# Patient Record
Sex: FEMALE | Race: WHITE | Employment: OTHER | URBAN - METROPOLITAN AREA
[De-identification: names, ages, dates, MRNs, and addresses within clinical notes are randomized per-mention and may not be internally consistent; named-entity substitution may affect disease eponyms.]

---

## 2017-02-13 ENCOUNTER — OFFICE VISIT (OUTPATIENT)
Dept: INTERNAL MEDICINE | Facility: CLINIC | Age: 57
End: 2017-02-13
Payer: COMMERCIAL

## 2017-02-13 VITALS
OXYGEN SATURATION: 96 % | BODY MASS INDEX: 23.4 KG/M2 | SYSTOLIC BLOOD PRESSURE: 100 MMHG | DIASTOLIC BLOOD PRESSURE: 60 MMHG | TEMPERATURE: 99 F | WEIGHT: 145 LBS | HEART RATE: 98 BPM

## 2017-02-13 DIAGNOSIS — G89.29 CHRONIC BILATERAL LOW BACK PAIN WITHOUT SCIATICA: ICD-10-CM

## 2017-02-13 DIAGNOSIS — M79.645 PAIN IN FINGER OF BOTH HANDS: Primary | ICD-10-CM

## 2017-02-13 DIAGNOSIS — M79.644 PAIN IN FINGER OF BOTH HANDS: Primary | ICD-10-CM

## 2017-02-13 DIAGNOSIS — M54.50 CHRONIC BILATERAL LOW BACK PAIN WITHOUT SCIATICA: ICD-10-CM

## 2017-02-13 LAB
CRP SERPL-MCNC: <2.9 MG/L (ref 0–8)
ERYTHROCYTE [DISTWIDTH] IN BLOOD BY AUTOMATED COUNT: 12.1 % (ref 10–15)
ERYTHROCYTE [SEDIMENTATION RATE] IN BLOOD BY WESTERGREN METHOD: 21 MM/H (ref 0–30)
HCT VFR BLD AUTO: 41 % (ref 35–47)
HGB BLD-MCNC: 13.5 G/DL (ref 11.7–15.7)
MCH RBC QN AUTO: 31.4 PG (ref 26.5–33)
MCHC RBC AUTO-ENTMCNC: 32.9 G/DL (ref 31.5–36.5)
MCV RBC AUTO: 95 FL (ref 78–100)
PLATELET # BLD AUTO: 357 10E9/L (ref 150–450)
RBC # BLD AUTO: 4.3 10E12/L (ref 3.8–5.2)
WBC # BLD AUTO: 8.4 10E9/L (ref 4–11)

## 2017-02-13 PROCEDURE — 86200 CCP ANTIBODY: CPT | Performed by: INTERNAL MEDICINE

## 2017-02-13 PROCEDURE — 36415 COLL VENOUS BLD VENIPUNCTURE: CPT | Performed by: INTERNAL MEDICINE

## 2017-02-13 PROCEDURE — 85652 RBC SED RATE AUTOMATED: CPT | Performed by: INTERNAL MEDICINE

## 2017-02-13 PROCEDURE — 86140 C-REACTIVE PROTEIN: CPT | Performed by: INTERNAL MEDICINE

## 2017-02-13 PROCEDURE — 99203 OFFICE O/P NEW LOW 30 MIN: CPT | Performed by: INTERNAL MEDICINE

## 2017-02-13 PROCEDURE — 85027 COMPLETE CBC AUTOMATED: CPT | Performed by: INTERNAL MEDICINE

## 2017-02-13 PROCEDURE — 86431 RHEUMATOID FACTOR QUANT: CPT | Performed by: INTERNAL MEDICINE

## 2017-02-13 NOTE — MR AVS SNAPSHOT
After Visit Summary   2/13/2017    Jeanne Santiago    MRN: [id number]           Patient Information     Date Of Birth          1960        Visit Information        Provider Department      2/13/2017 2:00 PM Noemy Casas MD [facility]        Today's Diagnoses     Pain in finger of both hands    -  1    Chronic bilateral low back pain without sciatica          Care Instructions    For finger joint pain:    Labs today (to evaluate for rheumatoid arthritis)    If those are normal, then the next step is hand therapy.    ---    For back pain, suspect lumbar strain (too much work on lower back).    Recommend course of physical therapy to improve core abdominal strength (to take the work off the back).             Follow-ups after your visit        Additional Services     PHYSICAL THERAPY REFERRAL       *This therapy referral will be filtered to a centralized scheduling office at [facility] and the patient will receive a call to schedule an appointment at a [facility] location most convenient for them. *     [facility] provides Physical Therapy evaluation and treatment and many specialty services across the [place] system.  If requesting a specialty program, please choose from the list below.    If you have not heard from the scheduling office within 2 business days, please call [phone] for all locations, with the exception of [place], please call [phone].  Treatment: Evaluation & Treatment  Special Instructions/Modalities: none  Special Programs: None    Please be aware that coverage of these services is subject to the terms and limitations of your health insurance plan.  Call member services at your health plan with any benefit or coverage questions.      **Note to Provider:  If you are referring outside of [facility] for the therapy appointment, please list the name of the location in the  special instructions  above, print  "the referral and give to the patient to schedule the appointment.                  Who to contact     If you have questions or need follow up information about today's clinic visit or your schedule please contact [facility] directly at [phone].  Normal or non-critical lab and imaging results will be communicated to you by LiveSchoolhart, letter or phone within 4 business days after the clinic has received the results. If you do not hear from us within 7 days, please contact the clinic through LiveSchoolhart or phone. If you have a critical or abnormal lab result, we will notify you by phone as soon as possible.  Submit refill requests through Periscope or call your pharmacy and they will forward the refill request to us. Please allow 3 business days for your refill to be completed.          Additional Information About Your Visit        Periscope Information     Periscope lets you send messages to your doctor, view your test results, renew your prescriptions, schedule appointments and more. To sign up, go to [email or web address] . Click on \"Log in\" on the left side of the screen, which will take you to the Welcome page. Then click on \"Sign up Now\" on the right side of the page.     You will be asked to enter the access code listed below, as well as some personal information. Please follow the directions to create your username and password.     Your access code is: [email or web address]  Expires: 2017  2:35 PM     Your access code will  in 90 days. If you need help or a new code, please call your [place] clinic or [phone].        Care EveryWhere ID     This is your Care EveryWhere ID. This could be used by other organizations to access your [place] medical records  [id number]        Your Vitals Were     Pulse Temperature Last Period Pulse Oximetry BMI (Body Mass Index)       98 99  F (37.2  C) 2016 (Approximate) 96% 23.4 kg/m2        Blood Pressure from Last 3 Encounters:   17 " 100/60   06/29/16 120/77   06/07/16 120/60    Weight from Last 3 Encounters:   02/13/17 145 lb (65.8 kg)   06/17/16 155 lb 3.2 oz (70.4 kg)   06/07/16 160 lb (72.6 kg)              We Performed the Following     CBC with platelets     CRP, inflammation     Cyclic Citrullinated Peptide Antibody IgG     ESR: Erythrocyte sedimentation rate     PHYSICAL THERAPY REFERRAL     Rheumatoid factor        Primary Care Provider    None Specified       No primary provider on file.        Thank you!     Thank you for choosing [facility]  for your care. Our goal is always to provide you with excellent care. Hearing back from our patients is one way we can continue to improve our services. Please take a few minutes to complete the written survey that you may receive in the mail after your visit with us. Thank you!             Your Updated Medication List - Protect others around you: Learn how to safely use, store and throw away your medicines at www.disposemymeds.org.          This list is accurate as of: 2/13/17  2:36 PM.  Always use your most recent med list.                   Brand Name Dispense Instructions for use    ARTIFICIAL TEARS 1.4 % ophthalmic solution   Generic drug:  polyvinyl alcohol      Place 1 drop into both eyes every 4 hours as needed for dry eyes       calcium carb 1250 mg (500 mg [place])/vitamin D 200 units 500-200 MG-UNIT per tablet    OSCAL with D     Take 1 tablet by mouth 2 times daily       CERTAVITE/ANTIOXIDANTS PO      Take 1 tablet by mouth daily       clonazePAM 0.25 MG Tbdp ODT tab    klonoPIN    60 tablet    Take 1 tablet (0.25 mg) by mouth 2 times daily as needed for anxiety       hydrOXYzine 50 MG tablet    ATARAX     Take 50 mg by mouth every 8 hours as needed for itching       ketotifen 0.025 % Soln ophthalmic solution    ZADITOR/REFRESH ANTI-ITCH     Place 1 drop into both eyes 2 times daily PT RAN OUT ON 6/15 AND NEEDED AN MD TO OK A REFILL       sodium chloride 0.65 %  nasal spray    OCEAN     Spray 1 spray into both nostrils every 2 hours as needed for congestion       venlafaxine 150 MG Tb24 24 hr tablet    EFFEXOR-ER     Take 150 mg by mouth daily (with breakfast)       vitamin D 2000 UNITS Caps      Take 2,000 Units by mouth daily

## 2017-02-13 NOTE — PROGRESS NOTES
"  SUBJECTIVE:                                                      HPI: Jeanne Santiago is a pleasant 56 year old female who presents with finger and back pain:    Accompanied by group home staff member.    Re: finger pain:  - involves fingers and thumb of both hands, equally  - pain is located in joints (indicates DIP and PIP) only - no MCP involvement  - ongoing for a year now  - no precipitating injury  - no new manual activities, jobs, or hobbies  - describes as tender to the touch  - associated stiffness  - associated numbness and tingling over DIP and PIPs    - no associated joint or finger swelling, redness, or skin changes  - no loss of function, but finds activities using her hands uncomfortable   - for example, has pain with lifting groceries  - no fevers or chills    - reports that wrist are sometimes tender and stiff, but no consistent symptoms   - no other joint involvement    No FH of RA.    Re: back pain:  - bilateral lower back, symmetric  - no radiation down legs  - ongoing now for 9 months  - no precipitating trauma, fall, or unusual exertion  - describes are soreness and stiffness  - notices it most when standing up (from seated position) and going up stairs    - no lower leg weakness, numbness, or tingling  - no urinary retention or incontinence  - no fecal incontinence    - pain improves with ibuprofen use    No prior history of back injuries, procedures, or surgeries.   Does not perform any strenuous activities during day - sits for most of day \"not doing much.\"  Does not exercise.    PMH reviewed - multiple active psychiatric diagnoses, but none seem pertinent to above concerns.    PMH also significant for cognitive impairment of unclear etiology - theoretically may affect above history, but patient's recall of history of symptoms seems appropriate.     The medication, allergy, and problem lists have been reviewed and updated as appropriate.     OBJECTIVE:                                          "             /60 (BP Location: Left arm, Patient Position: Chair, Cuff Size: Adult Regular)  Pulse 98  Temp 99  F (37.2  C)  Wt 145 lb (65.8 kg)  LMP 05/27/2016 (Approximate)  SpO2 96%  BMI 23.4 kg/m2  Constitutional: well-appearing  Lumbar spine: no deformity, crepitus, or step-off; no spinal tenderness to palpation; bilateral brannon-spinal tenderness to palpation; negative straight leg raise bilaterally  Wrist/Hand/Finger exam:  - all joints are normal in appearance - no deformity, swelling, redness, or skin changes  - normal range of motion  - full strength  - all DIP and PIPs are equally tender to palpation; no bogginess noted    ASSESSMENT/PLAN:                                                      (M79.645,  M79.644) Pain in finger of both hands  (primary encounter diagnosis)  Comment: etiology unclear, but need to rule out RA.  Plan:    - CBC, CRP, ESR, CCP, and RF.   - if labs unrevealing, will refer to hand therapy for evaluation and treatment.   - if pain worsens, changes, or does not respond to above, patient to contact MD.     (M54.5,  G89.29) Chronic bilateral low back pain without sciatica  Comment: suspect lumbar strain from poor core abdominal strength (lack of exercise, sedentary lifestyle).  Plan:    - warm compresses, NSAIDs, and Tylenol as needed.   - referred to PT for further evaluation and management.    - if pain worsens, changes, or does not respond to above, patient to contact MD.     The instructions on the AVS were discussed and explained to the patient. Patient expressed understanding of instructions.    A total of 30 minutes were spent face-to-face with this patient during this encounter and over half of that time was spent on counseling and coordination of care re: above diagnoses and plans of care.     Noemy Casas MD   [address] [address]  T: [phone], F: [phone]

## 2017-02-13 NOTE — PATIENT INSTRUCTIONS
For finger joint pain:    Labs today (to evaluate for rheumatoid arthritis)    If those are normal, then the next step is hand therapy.    ---    For back pain, suspect lumbar strain (too much work on lower back).    Recommend course of physical therapy to improve core abdominal strength (to take the work off the back).

## 2017-02-15 LAB — CCP AB SER IA-ACNC: 1 U/ML

## 2017-02-17 ENCOUNTER — TELEPHONE (OUTPATIENT)
Dept: INTERNAL MEDICINE | Facility: CLINIC | Age: 57
End: 2017-02-17

## 2017-02-17 ENCOUNTER — THERAPY VISIT (OUTPATIENT)
Dept: PHYSICAL THERAPY | Facility: CLINIC | Age: 57
End: 2017-02-17
Payer: COMMERCIAL

## 2017-02-17 DIAGNOSIS — G89.29 CHRONIC BILATERAL LOW BACK PAIN WITHOUT SCIATICA: Primary | ICD-10-CM

## 2017-02-17 DIAGNOSIS — M54.50 CHRONIC BILATERAL LOW BACK PAIN WITHOUT SCIATICA: Primary | ICD-10-CM

## 2017-02-17 DIAGNOSIS — M25.549 PAIN IN MULTIPLE FINGER JOINTS: Primary | ICD-10-CM

## 2017-02-17 LAB — RHEUMATOID FACT SER NEPH-ACNC: <20 IU/ML (ref 0–20)

## 2017-02-17 PROCEDURE — 97110 THERAPEUTIC EXERCISES: CPT | Mod: GP | Performed by: PHYSICAL THERAPIST

## 2017-02-17 PROCEDURE — 97161 PT EVAL LOW COMPLEX 20 MIN: CPT | Mod: GP | Performed by: PHYSICAL THERAPIST

## 2017-02-17 NOTE — TELEPHONE ENCOUNTER
Discussed results with patient - all normal and unremarkable.    Next step is to see hand therapy to improve manual dexterity and strength.    Referral placed.

## 2017-02-17 NOTE — PROGRESS NOTES
"[facility] Initial Evaluation    Subjective:    Jeanne Santiago is a 56 year old female with a lumbar condition.  Condition occurred with:  Insidious onset.  Condition occurred: for unknown reasons.  This is a chronic condition  Onset of B LBP February 2016 for unknown reasons. MD orders dated 2-13-17/ Pt states \"I'm out of shape. I'm weak.\" Cannot get off floor without pushing up on furniture. Complains of stiffness and soreness across low back. LBP with sit to stand and with prolonged sitting. Carrying groceries is bothersome. Cannot lift a double pack of milk (15#).    Patient reports pain:  Lumbar spine right and lumbar spine left.    Pain is described as aching and other (stiff) and is intermittent and reported as 4/10 and 2/10.   Worse during: varies.  Symptoms are exacerbated by lifting, sitting and other (sit to stand) and relieved by NSAID's.  Since onset symptoms are unchanged.        General health as reported by patient is good.                  Barriers include:  None as reported by the patient (has split entry home).    Red flags:  None as reported by the patient.                      Objective:    System         Lumbar/SI Evaluation  ROM:    AROM Lumbar:   Flexion:        Hands to mid lower leg  Ext:                    50% (L LBP)   Side Bend:        Left:     Right:   Rotation:           Left:     Right:   Side Glide:        Left:     Right:           Lumbar Myotomes:  not assessed            Lumbar DTR's:  not assessed        Lumbar Dermtomes:  not assessed                Neural Tension/Mobility:      Left side:Slump  negative.     Right side:   Slump  negative.   Lumbar Palpation:    Tenderness present at Left:    Quadratus Lumborum and Erector Spinae (hypertonicity)    Tenderness not present at Right:  Quadratus Lumborum or Erector Spinae      Spinal Segmental Conclusions:     Level: Hypo noted at L1, L2, L3, L4 and L5                                                     Nava" Lumbar Evaluation    Posture:    Standing: fair    Lateral Shift: no      Movement Loss:  Flexion (Flex): min  Extension (EXT): mod and pain                                                   ROS    Started with 2/10 L LBP.   Prone lying abolished sx's.  NOA produced L LBP, NW after.  Unable to perform REIL due to weakness.  LORENZO increased L LBP during, NW after, increased motion.      Assessment/Plan:      Patient is a 56 year old female with lumbar complaints.  Her CC is soreness and stiffness across low back especially after sitting and with sit to stand. Lifting her groceries is bothersome. She feels she is generally weak and would like to have strengthening exercises.   Patient has the following significant findings with corresponding treatment plan.                Diagnosis 1:  B LBP  Pain -  manual therapy and directional preference exercise  Decreased ROM/flexibility - manual therapy and therapeutic exercise  Decreased joint mobility - manual therapy and therapeutic exercise  Decreased strength - therapeutic exercise and therapeutic activities  Decreased function - therapeutic activities  Impaired posture - neuro re-education    Therapy Evaluation Codes:   1) History comprised of:   Personal factors that impact the plan of care:      None.    Comorbidity factors that impact the plan of care are:      None.     Medications impacting care: None.  2) Examination of Body Systems comprised of:   Body structures and functions that impact the plan of care:      Lumbar spine.   Activity limitations that impact the plan of care are:      Lifting and Sitting.  3) Clinical presentation characteristics are:   Stable/Uncomplicated.  4) Decision-Making    Low complexity using standardized patient assessment instrument and/or measureable assessment of functional outcome.  Cumulative Therapy Evaluation is: Low complexity.    Previous and current functional limitations:  (See Goal Flow Sheet for this information)    Short  term and Long term goals: (See Goal Flow Sheet for this information)     Communication ability:  Patient appears to be able to clearly communicate and understand verbal and written communication and follow directions correctly.  Treatment Explanation - The following has been discussed with the patient:   RX ordered/plan of care  Anticipated outcomes  Possible risks and side effects  This patient would benefit from PT intervention to resume normal activities.   Rehab potential is good.    Frequency:  1 X week, once daily  Duration:  for 8 weeks  Discharge Plan:  Achieve all LTG.  Independent in home treatment program.  Reach maximal therapeutic benefit.    Please refer to the daily flowsheet for treatment today, total treatment time and time spent performing 1:1 timed codes.

## 2017-02-17 NOTE — PROGRESS NOTES
Subjective:                                       Pertinent medical history includes:  Mental illness and depression.  Medical allergies: yes (Lavaquine).    Current medications:  None as reported by patient.  Current occupation is None.    Primary job tasks include:  Prolonged sitting.            Oswestry Score: 13.33 %                 Objective:    System    Physical Exam    General     ROS    Assessment/Plan:

## 2017-02-17 NOTE — MR AVS SNAPSHOT
"              After Visit Summary   2/17/2017    Jeanne Santiago    MRN: [id number]           Patient Information     Date Of Birth          1960        Visit Information        Provider Department      2/17/2017 10:50 AM Teresa Kinsey PT [facility]        Today's Diagnoses     Chronic bilateral low back pain without sciatica    -  1       Follow-ups after your visit        Your next 10 appointments already scheduled     Feb 24, 2017 11:30 AM [facility]   CAROLINE Spine with Teresa Kinsey PT   [facility] ([facility]    [address] [id number] [phone]              Who to contact     If you have questions or need follow up information about today's clinic visit or your schedule please contact [facility] PH[facility]ERAP[facility] directly at [phone].  Normal or non-critical lab and imaging results will be communicated to you by PPIhart, letter or phone within 4 business days after the clinic has received the results. If you do not hear from us within 7 days, please contact the clinic through PPIhart or phone. If you have a critical or abnormal lab result, we will notify you by phone as soon as possible.  Submit refill requests through Fancloud or call your pharmacy and they will forward the refill request to us. Please allow 3 business days for your refill to be completed.          Additional Information About Your Visit        MyChart Information     Fancloud lets you send messages to your doctor, view your test results, renew your prescriptions, schedule appointments and more. To sign up, go to [email or web address] . Click on \"Log in\" on the left side of the screen, which will take you to the Welcome page. Then click on \"Sign up Now\" on the right side of the page.     You will be asked to enter the access code listed below, as well as some " personal information. Please follow the directions to create your username and password.     Your access code is: [email or web address]  Expires: 2017  2:35 PM     Your access code will  in 90 days. If you need help or a new code, please call your [place] clinic or [phone].        Care EveryWhere ID     This is your Care EveryWhere ID. This could be used by other organizations to access your [place] medical records  [id number]        Your Vitals Were     Last Period                   2016 (Approximate)            Blood Pressure from Last 3 Encounters:   17 100/60   16 120/77   16 120/60    Weight from Last 3 Encounters:   17 65.8 kg (145 lb)   16 70.4 kg (155 lb 3.2 oz)   16 72.6 kg (160 lb)              We Performed the Following     HC PT EVAL, LOW COMPLEXITY     CAROLINE INITIAL EVAL REPORT     THERAPEUTIC EXERCISES        Primary Care Provider    None Specified       No primary provider on file.        Thank you!     Thank you for choosing [facility]ER[facility]  for your care. Our goal is always to provide you with excellent care. Hearing back from our patients is one way we can continue to improve our services. Please take a few minutes to complete the written survey that you may receive in the mail after your visit with us. Thank you!             Your Updated Medication List - Protect others around you: Learn how to safely use, store and throw away your medicines at www.disposemymeds.org.          This list is accurate as of: 17  2:10 PM.  Always use your most recent med list.                   Brand Name Dispense Instructions for use    ARTIFICIAL TEARS 1.4 % ophthalmic solution   Generic drug:  polyvinyl alcohol      Place 1 drop into both eyes every 4 hours as needed for dry eyes       calcium carb 1250 mg (500 mg [place])/vitamin D 200 units 500-200 MG-UNIT per tablet    OSCAL with D     Take 1 tablet by mouth 2 times  daily       CERTAVITE/ANTIOXIDANTS PO      Take 1 tablet by mouth daily       clonazePAM 0.25 MG Tbdp ODT tab    klonoPIN    60 tablet    Take 1 tablet (0.25 mg) by mouth 2 times daily as needed for anxiety       hydrOXYzine 50 MG tablet    ATARAX     Take 50 mg by mouth every 8 hours as needed for itching       ketotifen 0.025 % Soln ophthalmic solution    ZADITOR/REFRESH ANTI-ITCH     Place 1 drop into both eyes 2 times daily PT RAN OUT ON 6/15 AND NEEDED AN MD TO OK A REFILL       sodium chloride 0.65 % nasal spray    OCEAN     Spray 1 spray into both nostrils every 2 hours as needed for congestion       venlafaxine 150 MG Tb24 24 hr tablet    EFFEXOR-ER     Take 150 mg by mouth daily (with breakfast)       vitamin D 2000 UNITS Caps      Take 2,000 Units by mouth daily

## 2017-02-24 ENCOUNTER — THERAPY VISIT (OUTPATIENT)
Dept: PHYSICAL THERAPY | Facility: CLINIC | Age: 57
End: 2017-02-24
Payer: COMMERCIAL

## 2017-02-24 DIAGNOSIS — G89.29 CHRONIC BILATERAL LOW BACK PAIN WITHOUT SCIATICA: ICD-10-CM

## 2017-02-24 DIAGNOSIS — M54.50 CHRONIC BILATERAL LOW BACK PAIN WITHOUT SCIATICA: ICD-10-CM

## 2017-02-24 PROCEDURE — 97530 THERAPEUTIC ACTIVITIES: CPT | Mod: GP | Performed by: PHYSICAL THERAPIST

## 2017-02-24 PROCEDURE — 97110 THERAPEUTIC EXERCISES: CPT | Mod: GP | Performed by: PHYSICAL THERAPIST

## 2017-03-03 ENCOUNTER — THERAPY VISIT (OUTPATIENT)
Dept: PHYSICAL THERAPY | Facility: CLINIC | Age: 57
End: 2017-03-03
Payer: COMMERCIAL

## 2017-03-03 DIAGNOSIS — G89.29 CHRONIC BILATERAL LOW BACK PAIN WITHOUT SCIATICA: ICD-10-CM

## 2017-03-03 DIAGNOSIS — M54.50 CHRONIC BILATERAL LOW BACK PAIN WITHOUT SCIATICA: ICD-10-CM

## 2017-03-03 PROCEDURE — 97530 THERAPEUTIC ACTIVITIES: CPT | Mod: GP | Performed by: PHYSICAL THERAPIST

## 2017-03-03 PROCEDURE — 97110 THERAPEUTIC EXERCISES: CPT | Mod: GP | Performed by: PHYSICAL THERAPIST

## 2017-03-09 ENCOUNTER — THERAPY VISIT (OUTPATIENT)
Dept: PHYSICAL THERAPY | Facility: CLINIC | Age: 57
End: 2017-03-09
Payer: COMMERCIAL

## 2017-03-09 DIAGNOSIS — G89.29 CHRONIC BILATERAL LOW BACK PAIN WITHOUT SCIATICA: ICD-10-CM

## 2017-03-09 DIAGNOSIS — M54.50 CHRONIC BILATERAL LOW BACK PAIN WITHOUT SCIATICA: ICD-10-CM

## 2017-03-09 PROCEDURE — 97140 MANUAL THERAPY 1/> REGIONS: CPT | Mod: GP | Performed by: PHYSICAL THERAPIST

## 2017-03-09 PROCEDURE — 97110 THERAPEUTIC EXERCISES: CPT | Mod: GP | Performed by: PHYSICAL THERAPIST

## 2017-03-17 ENCOUNTER — THERAPY VISIT (OUTPATIENT)
Dept: PHYSICAL THERAPY | Facility: CLINIC | Age: 57
End: 2017-03-17
Payer: COMMERCIAL

## 2017-03-17 DIAGNOSIS — M54.50 CHRONIC BILATERAL LOW BACK PAIN WITHOUT SCIATICA: ICD-10-CM

## 2017-03-17 DIAGNOSIS — G89.29 CHRONIC BILATERAL LOW BACK PAIN WITHOUT SCIATICA: ICD-10-CM

## 2017-03-17 PROCEDURE — 97530 THERAPEUTIC ACTIVITIES: CPT | Mod: GP | Performed by: PHYSICAL THERAPIST

## 2017-03-17 PROCEDURE — 97110 THERAPEUTIC EXERCISES: CPT | Mod: GP | Performed by: PHYSICAL THERAPIST

## 2017-03-17 NOTE — MR AVS SNAPSHOT
"              After Visit Summary   3/17/2017    Jeanne Santiago    MRN: [id number]           Patient Information     Date Of Birth          1960        Visit Information        Provider Department      3/17/2017 11:30 AM Teresa Kinsey PT [facility] Physical Therapy        Today's Diagnoses     Chronic bilateral low back pain without sciatica           Follow-ups after your visit        Who to contact     If you have questions or need follow up information about today's clinic visit or your schedule please contact [facility] PH[facility] THERAPY directly at [phone].  Normal or non-critical lab and imaging results will be communicated to you by [employer], letter or phone within 4 business days after the clinic has received the results. If you do not hear from us within 7 days, please contact the clinic through [employer] or phone. If you have a critical or abnormal lab result, we will notify you by phone as soon as possible.  Submit refill requests through [employer] or call your pharmacy and they will forward the refill request to us. Please allow 3 business days for your refill to be completed.          Additional Information About Your Visit        MyChart Information     [employer] lets you send messages to your doctor, view your test results, renew your prescriptions, schedule appointments and more. To sign up, go to [email or web address] . Click on \"Log in\" on the left side of the screen, which will take you to the Welcome page. Then click on \"Sign up Now\" on the right side of the page.     You will be asked to enter the access code listed below, as well as some personal information. Please follow the directions to create your username and password.     Your access code is: [email or web address]  Expires: 2017  3:35 PM     Your access code will  in 90 days. If you need help or a new code, please call your [place] clinic or [phone].      "   Care EveryWhere ID     This is your Care EveryWhere ID. This could be used by other organizations to access your [facility] medical records  [id number]        Your Vitals Were     Last Period                   05/27/2016 (Approximate)            Blood Pressure from Last 3 Encounters:   02/13/17 100/60   06/29/16 120/77   06/07/16 120/60    Weight from Last 3 Encounters:   02/13/17 65.8 kg (145 lb)   06/17/16 70.4 kg (155 lb 3.2 oz)   06/07/16 72.6 kg (160 lb)              We Performed the Following     CAROLINE PROGRESS NOTES REPORT     THERAPEUTIC ACTIVITIES     THERAPEUTIC EXERCISES        Primary Care Provider    None Specified       No primary provider on file.        Thank you!     Thank you for choosing [place] [facility] PHYSICAL THERAPY  for your care. Our goal is always to provide you with excellent care. Hearing back from our patients is one way we can continue to improve our services. Please take a few minutes to complete the written survey that you may receive in the mail after your visit with us. Thank you!             Your Updated Medication List - Protect others around you: Learn how to safely use, store and throw away your medicines at www.disposemymeds.org.          This list is accurate as of: 3/17/17 12:39 PM.  Always use your most recent med list.                   Brand Name Dispense Instructions for use    ARTIFICIAL TEARS 1.4 % ophthalmic solution   Generic drug:  polyvinyl alcohol      Place 1 drop into both eyes every 4 hours as needed for dry eyes       calcium carb 1250 mg (500 mg [place])/vitamin D 200 units 500-200 MG-UNIT per tablet    OSCAL with D     Take 1 tablet by mouth 2 times daily       CERTAVITE/ANTIOXIDANTS PO      Take 1 tablet by mouth daily       clonazePAM 0.25 MG Tbdp ODT tab    klonoPIN    60 tablet    Take 1 tablet (0.25 mg) by mouth 2 times daily as needed for anxiety       hydrOXYzine 50 MG tablet    ATARAX     Take 50 mg by mouth every 8 hours as  needed for itching       ketotifen 0.025 % Soln ophthalmic solution    ZADITOR/REFRESH ANTI-ITCH     Place 1 drop into both eyes 2 times daily PT RAN OUT ON 6/15 AND NEEDED AN MD TO OK A REFILL       sodium chloride 0.65 % nasal spray    OCEAN     Spray 1 spray into both nostrils every 2 hours as needed for congestion       venlafaxine 150 MG Tb24 24 hr tablet    EFFEXOR-ER     Take 150 mg by mouth daily (with breakfast)       vitamin D 2000 UNITS Caps      Take 2,000 Units by mouth daily

## 2017-03-17 NOTE — PROGRESS NOTES
"Subjective:    HPI                    Objective:    System    Physical Exam    General     ROS    Assessment/Plan:      DISCHARGE REPORT    Progress reporting period is from 2-17-17 to 3-17-17.       SUBJECTIVE   Feeling much better. Back stiffness and ache is nearly gone. Lifting and bending without pain. Feels stronger since doing the exercises. Feels ready to continue on her own.    Current Pain level: 0/10.      Initial Pain level: 4/10.   Changes in function:  Yes (See Goal flowsheet attached for changes in current functional level)  Adverse reaction to treatment or activity: None    OBJECTIVE  Changes noted in objective findings:  Yes, improved lumbar motion, body mechanics, strength as demonstrated by increased reps and difficulty of exercises, and decreased hypertonicity/TTP.  Objective: LAROM: flex=hands 4\" from ankles (mid LE at IE), ext=75% (50% at IE). No TTP or hypertonicity to lumbar erector spinae on left (was present at IE.) Able to demonstrate good body mechanics with partial squat lift of 15# crate from floor to seat height.     ASSESSMENT/PLAN  Patient has progressed well and is independent in home exercise program. She is ready to continue on her own with her HEP. She will call if she has any problems.  Updated problem list and treatment plan: Diagnosis 1:  LBP    STG/LTGs have been met or progress has been made towards goals:  Yes (See Goal flow sheet completed today.)  Assessment of Progress: The patient's condition is improving.  The patient has met all of their long term goals.  Self Management Plans:  Patient is independent in a home treatment program.  Patient is independent in self management of symptoms.    Jeanne continues to require the following intervention to meet STG and LTG's:  PT intervention is no longer required to meet STG/LTG.    Recommendations:  This patient is ready to be discharged from therapy and continue their home treatment program.    Please refer to the daily flowsheet " for treatment today, total treatment time and time spent performing 1:1 timed codes.

## 2017-05-19 ENCOUNTER — RADIANT APPOINTMENT (OUTPATIENT)
Dept: MAMMOGRAPHY | Facility: CLINIC | Age: 57
End: 2017-05-19
Attending: INTERNAL MEDICINE
Payer: COMMERCIAL

## 2017-05-19 ENCOUNTER — OFFICE VISIT (OUTPATIENT)
Dept: INTERNAL MEDICINE | Facility: CLINIC | Age: 57
End: 2017-05-19
Payer: COMMERCIAL

## 2017-05-19 VITALS
OXYGEN SATURATION: 96 % | HEART RATE: 93 BPM | WEIGHT: 142.6 LBS | DIASTOLIC BLOOD PRESSURE: 60 MMHG | SYSTOLIC BLOOD PRESSURE: 110 MMHG | HEIGHT: 65 IN | BODY MASS INDEX: 23.76 KG/M2 | TEMPERATURE: 98.5 F

## 2017-05-19 DIAGNOSIS — Z12.39 SCREENING FOR BREAST CANCER: ICD-10-CM

## 2017-05-19 DIAGNOSIS — Z00.00 ROUTINE HISTORY AND PHYSICAL EXAMINATION OF ADULT: Primary | ICD-10-CM

## 2017-05-19 DIAGNOSIS — Z11.59 NEED FOR HEPATITIS C SCREENING TEST: ICD-10-CM

## 2017-05-19 DIAGNOSIS — Z12.11 SCREEN FOR COLON CANCER: ICD-10-CM

## 2017-05-19 DIAGNOSIS — Z13.220 SCREENING FOR CHOLESTEROL LEVEL: ICD-10-CM

## 2017-05-19 DIAGNOSIS — Z13.1 SCREENING FOR DIABETES MELLITUS: ICD-10-CM

## 2017-05-19 LAB
CHOLEST SERPL-MCNC: 231 MG/DL
GLUCOSE SERPL-MCNC: 97 MG/DL (ref 70–99)
HCV AB SERPL QL IA: NORMAL
HDLC SERPL-MCNC: 65 MG/DL
LDLC SERPL CALC-MCNC: 150 MG/DL
NONHDLC SERPL-MCNC: 166 MG/DL
TRIGL SERPL-MCNC: 79 MG/DL

## 2017-05-19 PROCEDURE — 36415 COLL VENOUS BLD VENIPUNCTURE: CPT | Performed by: INTERNAL MEDICINE

## 2017-05-19 PROCEDURE — 99396 PREV VISIT EST AGE 40-64: CPT | Performed by: INTERNAL MEDICINE

## 2017-05-19 PROCEDURE — 82947 ASSAY GLUCOSE BLOOD QUANT: CPT | Performed by: INTERNAL MEDICINE

## 2017-05-19 PROCEDURE — 86803 HEPATITIS C AB TEST: CPT | Performed by: INTERNAL MEDICINE

## 2017-05-19 PROCEDURE — 80061 LIPID PANEL: CPT | Performed by: INTERNAL MEDICINE

## 2017-05-19 PROCEDURE — G0202 SCR MAMMO BI INCL CAD: HCPCS | Mod: TC

## 2017-05-19 RX ORDER — VENLAFAXINE HYDROCHLORIDE 75 MG/1
75 CAPSULE, EXTENDED RELEASE ORAL DAILY
Refills: 2 | COMMUNITY
Start: 2017-05-04 | End: 2019-05-02

## 2017-05-19 RX ORDER — BREXPIPRAZOLE 2 MG/1
2 TABLET ORAL DAILY
Refills: 2 | COMMUNITY
Start: 2017-05-04 | End: 2018-02-20 | Stop reason: ALTCHOICE

## 2017-05-19 RX ORDER — QUETIAPINE FUMARATE 200 MG/1
200 TABLET, FILM COATED ORAL DAILY
Refills: 2 | COMMUNITY
Start: 2017-05-04 | End: 2018-02-20

## 2017-05-19 RX ORDER — QUETIAPINE FUMARATE 100 MG/1
100 TABLET, FILM COATED ORAL PRN
COMMUNITY
Start: 2017-05-19 | End: 2018-02-20

## 2017-05-19 NOTE — LETTER
[address] [address]  [phone]      05/19/17      Jeanne Santiago  [address] [address]        Dear ,    It was a pleasure seeing you again the other day! I hope this finds you well.    I wanted to let you know that your labs came back.    Your cholesterol is bit elevated, but no medication is needed. I would, instead, recommend lifestyle changes including adhering to a healthy diet and exercising regularly (goal is 30 minutes/day, 5 days/week).     Please feel free to contact me with any questions or concerns.      Take care,    Noemy Casas MD   [address] [address]  T: [phone], F: [phone]

## 2017-05-19 NOTE — MR AVS SNAPSHOT
After Visit Summary   5/19/2017    Jeanne Santiago    MRN: [id number]           Patient Information     Date Of Birth          1960        Visit Information        Provider Department      5/19/2017 9:45 AM Noemy Casas MD [facility]        Today's Diagnoses     Screening for breast cancer    -  1    Screen for colon cancer        Screening for cholesterol level        Screening for diabetes mellitus        Need for hepatitis C screening test          Care Instructions    Please schedule mammogram on your way out.    ---    You will be contacted to schedule your colonoscopy.    ---    Labs - please proceed to our first floor laboratory to have these drawn (show them your orange ticket).     Results:    If normal: we will release results in [employer] or send them in the mail. You will not be called for these results.    If abnormal, but non-urgent: we will release results in [employer] or send them in the mail. You will not be called for these results.    If abnormal and urgent: we will call you.    ---                Follow-ups after your visit        Additional Services     GASTROENTEROLOGY ADULT REF PROCEDURE ONLY       Last Lab Result: Creatinine (mg/dL)       Date                     Value                 06/17/2016               0.64             ----------  Body mass index is 24.02 kg/(m^2).      Patient will be contacted to schedule procedure.     Please be aware that coverage of these services is subject to the terms and limitations of your health insurance plan.  Call member services at your health plan with any benefit or coverage questions.  Any procedures must be performed at a [facility] facility OR coordinated by your clinic's referral office.    Please bring the following with you to your appointment:    (1) Any X-Rays, CTs or MRIs which have been performed.  Contact the facility where they were done to arrange for  prior to your scheduled appointment.   "  (2) List of current medications   (3) This referral request   (4) Any documents/labs given to you for this referral                  Future tests that were ordered for you today     Open Future Orders        Priority Expected Expires Ordered    MA Screening Digital Bilateral Routine  2018            Who to contact     If you have questions or need follow up information about today's clinic visit or your schedule please contact [facility] directly at [phone].  Normal or non-critical lab and imaging results will be communicated to you by [employer], letter or phone within 4 business days after the clinic has received the results. If you do not hear from us within 7 days, please contact the clinic through [employer] or phone. If you have a critical or abnormal lab result, we will notify you by phone as soon as possible.  Submit refill requests through [employer] or call your pharmacy and they will forward the refill request to us. Please allow 3 business days for your refill to be completed.          Additional Information About Your Visit        [employer] Information     [employer] lets you send messages to your doctor, view your test results, renew your prescriptions, schedule appointments and more. To sign up, go to [email or web address] . Click on \"Log in\" on the left side of the screen, which will take you to the Welcome page. Then click on \"Sign up Now\" on the right side of the page.     You will be asked to enter the access code listed below, as well as some personal information. Please follow the directions to create your username and password.     Your access code is: [email or web address]  Expires: 8/15/2017 10:30 AM     Your access code will  in 90 days. If you need help or a new code, please call your [place] clinic or [phone].        Care EveryWhere ID     This is your Care EveryWhere ID. This could be used by other organizations to access your [place] medical " "records  [id number]        Your Vitals Were     Pulse Temperature Height Last Period Pulse Oximetry BMI (Body Mass Index)    93 98.5  F (36.9  C) (Oral) 5' 4.6\" (1.641 m) 05/27/2016 (Approximate) 96% 24.02 kg/m2       Blood Pressure from Last 3 Encounters:   05/19/17 110/60   02/13/17 100/60   06/29/16 120/77    Weight from Last 3 Encounters:   05/19/17 142 lb 9.6 oz (64.7 kg)   02/13/17 145 lb (65.8 kg)   06/17/16 155 lb 3.2 oz (70.4 kg)              We Performed the Following     GASTROENTEROLOGY ADULT REF PROCEDURE ONLY     Glucose     Hepatitis C Screen Reflex to HCV RNA Quant and Genotype     Lipid Profile        Primary Care Provider    None Specified       No primary provider on file.        Thank you!     Thank you for choosing [facility]  for your care. Our goal is always to provide you with excellent care. Hearing back from our patients is one way we can continue to improve our services. Please take a few minutes to complete the written survey that you may receive in the mail after your visit with us. Thank you!             Your Updated Medication List - Protect others around you: Learn how to safely use, store and throw away your medicines at www.disposemymeds.org.          This list is accurate as of: 5/19/17 10:21 AM.  Always use your most recent med list.                   Brand Name Dispense Instructions for use    ARTIFICIAL TEARS 1.4 % ophthalmic solution   Generic drug:  polyvinyl alcohol      Place 1 drop into both eyes every 4 hours as needed for dry eyes       calcium carb 1250 mg (500 mg [place])/vitamin D 200 units 500-200 MG-UNIT per tablet    OSCAL with D     Take 1 tablet by mouth 2 times daily       CERTAVITE/ANTIOXIDANTS PO      Take 1 tablet by mouth daily       * QUEtiapine 200 MG tablet    SEROquel     Take 200 mg by mouth daily       * QUEtiapine 100 MG tablet    SEROquel     Take 1 tablet (100 mg) by mouth as needed       REXULTI 2 MG tablet   Generic drug:  " brexpiprazole      Take 2 mg by mouth daily       sodium chloride 0.65 % nasal spray    OCEAN     Spray 1 spray into both nostrils every 2 hours as needed for congestion       * venlafaxine 150 MG Tb24 24 hr tablet    EFFEXOR-ER     Take 150 mg by mouth daily (with breakfast)       * venlafaxine 75 MG 24 hr capsule    EFFEXOR-XR     Take 75 mg by mouth daily       vitamin D 2000 UNITS Caps      Take 2,000 Units by mouth daily       * Notice:  This list has 4 medication(s) that are the same as other medications prescribed for you. Read the directions carefully, and ask your doctor or other care provider to review them with you.

## 2017-05-19 NOTE — PATIENT INSTRUCTIONS
Please schedule mammogram on your way out.    ---    You will be contacted to schedule your colonoscopy.    ---    Labs - please proceed to our first floor laboratory to have these drawn (show them your orange ticket).     Results:    If normal: we will release results in [employer]t or send them in the mail. You will not be called for these results.    If abnormal, but non-urgent: we will release results in MyChart or send them in the mail. You will not be called for these results.    If abnormal and urgent: we will call you.    ---

## 2017-05-19 NOTE — PROGRESS NOTES
SUBJECTIVE:                                                      HPI: Jeanne Santiago is a pleasant 56 year old female who presents for a physical.    She is accompanied by a group home staff member.    No specific complaints, concerns, or questions.    ROS:  Constitutional: denies unintentional weight loss or gain; denies fevers, chills, or sweats     Cardiovascular: denies chest pain, palpitations, or edema  Respiratory: denies cough, wheezing, shortness of breath, or dyspnea on exertion  Gastrointestinal: denies nausea, vomiting, constipation, diarrhea, or abdominal pain  Genitourinary: denies urinary frequency, urgency, dysuria, or hematuria  Integumentary: denies rash or pruritus  Musculoskeletal: denies back pain, muscle pain, joint pain, or joint swelling  Neurologic: denies focal weakness, numbness, or tingling  Hematologic/Immunologic: denies history of anemia or blood transfusions  Endocrine: denies heat or cold intolerance; denies polyuria, polydipsia  Psychiatric: denies anxiety; see preventative health below    Past Medical History:   Diagnosis Date     Anxiety      Bipolar 1 disorder (H)      Schizophrenia (H)      Past Surgical History:   Procedure Laterality Date     COLONOSCOPY  8/22/2011    Procedure:COLONOSCOPY; colonoscopy; Surgeon:DONNA SANCHES; Location: GI     GYN SURGERY      uterine cyst removal x3     SEPTOPLASTY  2002    for broken nose     TUBAL LIGATION  1984     Family History   Problem Relation Age of Onset     Depression Mother      Chronic Obstructive Pulmonary Disease Mother      smoker     CEREBROVASCULAR DISEASE Father      later in life     Prostate Cancer Father      Cervical Cancer Paternal Aunt      Myocardial Infarction Paternal Grandfather      Type 2 Diabetes Paternal Grandmother      Aneurysm Paternal Grandmother      Breast Cancer No family hx of      Ovarian Cancer No family hx of      Colon Cancer No family hx of      Occupational History     Not working  "currently      Social History Main Topics     Smoking status: Former Smoker     Packs/day: 1.00     Years: 8.00     Types: Cigarettes     Quit date: 1/21/1991     Smokeless tobacco: Never Used     Alcohol use No     Drug use: No     Sexual activity: No     Social History Narrative    . Currently single.    3 adult kids.    2 step grand kids.     Sit-ups, goes to [employer], various exercises - tries to exercise daily.     Allergies   Allergen Reactions     Acetaminophen      rash     Codeine      rash     Levaquin Itching     Current Outpatient Prescriptions   Medication Sig     venlafaxine (EFFEXOR-XR) 75 MG 24 hr capsule Take 75 mg by mouth daily     QUEtiapine (SEROQUEL) 200 MG tablet Take 200 mg by mouth daily     REXULTI 2 MG tablet Take 2 mg by mouth daily     QUEtiapine (SEROQUEL) 100 MG tablet Take 1 tablet (100 mg) by mouth as needed     sodium chloride (OCEAN) 0.65 % nasal spray Spray 1 spray into both nostrils every 2 hours as needed for congestion     calcium carb 1250 mg, 500 mg [place],/vitamin D 200 units (OSCAL WITH D) 500-200 MG-UNIT per tablet Take 1 tablet by mouth 2 times daily     Multiple Vitamins-Minerals (CERTAVITE/ANTIOXIDANTS PO) Take 1 tablet by mouth daily      venlafaxine (EFFEXOR-ER) 150 MG TB24 Take 150 mg by mouth daily (with breakfast)     Cholecalciferol (VITAMIN D) 2000 UNITS CAPS Take 2,000 Units by mouth daily      polyvinyl alcohol (ARTIFICIAL TEARS) 1.4 % ophthalmic solution Place 1 drop into both eyes every 4 hours as needed for dry eyes      Immunization History   Administered Date(s) Administered     Influenza (IIV3) 09/22/2010     TD (ADULT, 7+) 12/24/1999     Tdap (Adacel,Boostrix) 11/01/2009     OBJECTIVE:                                                      /60 (BP Location: Left arm, Patient Position: Chair, Cuff Size: Adult Regular)  Pulse 93  Temp 98.5  F (36.9  C) (Oral)  Ht 5' 4.6\" (1.641 m)  Wt 142 lb 9.6 oz (64.7 kg)  LMP 05/27/2016 (Approximate)  SpO2 " 96%  BMI 24.02 kg/m2  Constitutional: well-appearing  Eyes: normal conjunctivae and lids; pupils equal, round, and reactive to light  Ears, Nose, Mouth, and Throat: normal ears and nose; tympanic membranes visualized and normal; normal lips, teeth, and gums; no oropharyngeal lesions or ulcers  Neck: supple and symmetric; no lymphadenopathy; no thyromegaly or masses  Respiratory: normal respiratory effort; clear to auscultation bilaterally  Cardiovascular: regular rate and rhythm; pedal pulses palpable; no edema  Breasts: normal appearance; no masses or skin retraction; no nipple discharge or bleeding; no axillary lymphadenopathy  Gastrointestinal: soft, non-tender, non-distended, and bowel sounds present; no organomegaly or masses  Musculoskeletal: normal gait and station; no clubbing or cyanosis  Psych: normal judgment and insight; normal mood and affect; recent and remote memory intact; oriented to time, place, and person    PREVENTATIVE HEALTH                                                      BMI: within normal limits   Blood pressure: within normal limits   Mammogram: DUE  Pap: last pap performed 6/1/6; report reviewed and was normal  Colonoscopy: last performed 2011; report reviewed; repeat DUE  Dexa: not medically indicated at this time   Screening HCV: DUE  Screening cholesterol: DUE  Screening diabetes: DUE  STD testing: no risk factors present  Depression screening: PHQ-2 assessment completed and reviewed - no intervention indicated at this time  Alcohol misuse screening: alcohol use reviewed - no intervention indicated at this time  Immunizations: reviewed; up to date     ASSESSMENT/PLAN:                                                       (Z00.00) Routine history and physical examination of adult  (primary encounter diagnosis)  Comment: PMH, PSH, FH, SH, medications, allergies, immunizations, and preventative health measures reviewed.   Plan: see below for plans.    (Z12.39) Screening for breast  cancer  Plan: mammogram ordered - patient to schedule.    (Z12.11) Screen for colon cancer  Plan: colonoscopy ordered - patient will be contacted to schedule.    (Z13.220) Screening for cholesterol level  (Z13.1) Screening for diabetes mellitus  (Z11.59) Need for hepatitis C screening test  Plan: fasting labs today.    The instructions on the AVS were discussed and explained to the patient. Patient expressed understanding of instructions.    Noemy Casas MD   [address] [address]  T: [phone], F: [phone]

## 2017-05-19 NOTE — NURSING NOTE
"Chief Complaint   Patient presents with     Physical     Pt is fasting today.       Initial /60 (BP Location: Left arm, Patient Position: Chair, Cuff Size: Adult Regular)  Pulse 93  Temp 98.5  F (36.9  C) (Oral)  Ht 5' 4.6\" (1.641 m)  Wt 142 lb 9.6 oz (64.7 kg)  LMP 05/27/2016 (Approximate)  SpO2 96%  BMI 24.02 kg/m2 Estimated body mass index is 24.02 kg/(m^2) as calculated from the following:    Height as of this encounter: 5' 4.6\" (1.641 m).    Weight as of this encounter: 142 lb 9.6 oz (64.7 kg).  Medication Reconciliation: complete       Kaminibose MA      "

## 2017-06-20 ENCOUNTER — SURGERY (OUTPATIENT)
Age: 57
End: 2017-06-20

## 2017-06-20 ENCOUNTER — HOSPITAL ENCOUNTER (OUTPATIENT)
Facility: CLINIC | Age: 57
Discharge: NURSING, REHAB OR LONG-TERM CARE | End: 2017-06-20
Attending: COLON & RECTAL SURGERY | Admitting: COLON & RECTAL SURGERY
Payer: COMMERCIAL

## 2017-06-20 VITALS
BODY MASS INDEX: 22.98 KG/M2 | DIASTOLIC BLOOD PRESSURE: 90 MMHG | RESPIRATION RATE: 16 BRPM | OXYGEN SATURATION: 94 % | WEIGHT: 143 LBS | SYSTOLIC BLOOD PRESSURE: 115 MMHG | HEIGHT: 66 IN

## 2017-06-20 LAB — COLONOSCOPY: NORMAL

## 2017-06-20 PROCEDURE — G0500 MOD SEDAT ENDO SERVICE >5YRS: HCPCS | Performed by: COLON & RECTAL SURGERY

## 2017-06-20 PROCEDURE — G0121 COLON CA SCRN NOT HI RSK IND: HCPCS | Performed by: COLON & RECTAL SURGERY

## 2017-06-20 PROCEDURE — 25000125 ZZHC RX 250: Performed by: COLON & RECTAL SURGERY

## 2017-06-20 PROCEDURE — 25000128 H RX IP 250 OP 636: Performed by: COLON & RECTAL SURGERY

## 2017-06-20 PROCEDURE — 45378 DIAGNOSTIC COLONOSCOPY: CPT | Performed by: COLON & RECTAL SURGERY

## 2017-06-20 RX ORDER — ONDANSETRON 4 MG/1
4 TABLET, ORALLY DISINTEGRATING ORAL EVERY 6 HOURS PRN
Status: CANCELLED | OUTPATIENT
Start: 2017-06-20

## 2017-06-20 RX ORDER — LIDOCAINE 40 MG/G
CREAM TOPICAL
Status: DISCONTINUED | OUTPATIENT
Start: 2017-06-20 | End: 2017-06-20 | Stop reason: HOSPADM

## 2017-06-20 RX ORDER — NALOXONE HYDROCHLORIDE 0.4 MG/ML
.1-.4 INJECTION, SOLUTION INTRAMUSCULAR; INTRAVENOUS; SUBCUTANEOUS
Status: CANCELLED | OUTPATIENT
Start: 2017-06-20 | End: 2017-06-21

## 2017-06-20 RX ORDER — ONDANSETRON 2 MG/ML
4 INJECTION INTRAMUSCULAR; INTRAVENOUS
Status: DISCONTINUED | OUTPATIENT
Start: 2017-06-20 | End: 2017-06-20 | Stop reason: HOSPADM

## 2017-06-20 RX ORDER — FENTANYL CITRATE 50 UG/ML
INJECTION, SOLUTION INTRAMUSCULAR; INTRAVENOUS PRN
Status: DISCONTINUED | OUTPATIENT
Start: 2017-06-20 | End: 2017-06-20 | Stop reason: HOSPADM

## 2017-06-20 RX ORDER — ONDANSETRON 2 MG/ML
4 INJECTION INTRAMUSCULAR; INTRAVENOUS EVERY 6 HOURS PRN
Status: CANCELLED | OUTPATIENT
Start: 2017-06-20

## 2017-06-20 RX ORDER — QUETIAPINE FUMARATE 50 MG/1
TABLET, FILM COATED ORAL 2 TIMES DAILY
COMMUNITY
End: 2018-02-20

## 2017-06-20 RX ORDER — FLUMAZENIL 0.1 MG/ML
0.2 INJECTION, SOLUTION INTRAVENOUS
Status: CANCELLED | OUTPATIENT
Start: 2017-06-20 | End: 2017-06-21

## 2017-06-20 RX ADMIN — FENTANYL CITRATE 100 MCG: 50 INJECTION, SOLUTION INTRAMUSCULAR; INTRAVENOUS at 16:42

## 2017-06-20 RX ADMIN — MIDAZOLAM HYDROCHLORIDE 2 MG: 1 INJECTION, SOLUTION INTRAMUSCULAR; INTRAVENOUS at 16:42

## 2017-06-20 NOTE — H&P
[facility]    History and Physical  Colon and Rectal Surgery     Date of Admission:  (Not on file)      Assessment & Plan   Jeanne Santiago is a 57 year old female who presents for colonoscopy.    Indication: screening  Plan for Colonoscopy with possible biopsy, possible polypectomy. We discussed the risks, benefits and alternatives and the patient wished to proceed.    The above has been forwarded to the consulting provider.      Dhruv Flores MD  [facility], [facility]  [phone]        Code Status   Full Code    Primary Care Physician   None      History is obtained from the patient    History of Present Illness   Jeanne Santiago is a 57 year old female who presents for screening    Past Medical History    I have reviewed this patient's medical history and updated it with pertinent information if needed.   Past Medical History:   Diagnosis Date     Anxiety      Bipolar 1 disorder (H)      Schizophrenia (H)        Past Surgical History   I have reviewed this patient's surgical history and updated it with pertinent information if needed.  Past Surgical History:   Procedure Laterality Date     COLONOSCOPY  8/22/2011    Procedure:COLONOSCOPY; colonoscopy; Surgeon:DONNA SANCHES; Location: GI     GYN SURGERY      uterine cyst removal x3     SEPTOPLASTY  2002    for broken nose     TUBAL LIGATION  1984       Prior to Admission Medications   Cannot display prior to admission medications because the patient has not been admitted in this contact.     Allergies   Allergies   Allergen Reactions     Acetaminophen      rash     Codeine      rash     Levaquin Itching       Social History   I have reviewed this patient's social history and updated it with pertinent information if needed. Jeanne Santiago  reports that she quit smoking about 26 years ago. Her smoking use included Cigarettes. She has a 8.00 pack-year smoking history. She has never used smokeless tobacco. She reports  that she does not drink alcohol or use illicit drugs.    Family History   I have reviewed this patient's family history and updated it with pertinent information if needed.   Family History   Problem Relation Age of Onset     Depression Mother      Chronic Obstructive Pulmonary Disease Mother      smoker     CEREBROVASCULAR DISEASE Father      later in life     Prostate Cancer Father      Cervical Cancer Paternal Aunt      Myocardial Infarction Paternal Grandfather      Type 2 Diabetes Paternal Grandmother      Aneurysm Paternal Grandmother      Breast Cancer No family hx of      Ovarian Cancer No family hx of      Colon Cancer No family hx of        Review of Systems   C: NEGATIVE for fever, chills, change in weight  E/M: NEGATIVE for ear, mouth and throat problems  R: NEGATIVE for significant cough or SOB  CV: NEGATIVE for chest pain, palpitations or peripheral edema    Physical Exam                      Vital Signs with Ranges     0 lbs 0 oz    Constitutional: awake, alert, cooperative, no apparent distress, and appears stated age  AIRWAY EXAM: Mallampatti Class I (visualization of the soft palate, fauces, uvula, anterior and posterior pillars)  Respiratory: No increased work of breathing, good air exchange, clear to auscultation bilaterally, no crackles or wheezing  Cardiovascular: Normal apical impulse, regular rate and rhythm, normal S1 and S2, no S3 or S4, and no murmur noted  ASA Class: 2 - Mild systemic disease

## 2017-06-20 NOTE — BRIEF OP NOTE
[facility] Brief Operative Note    Pre-operative diagnosis: screening   Post-operative diagnosis normal colonoscopy   Procedure: Procedure(s):  colonoscopy - Wound Class: II-Clean Contaminated   Surgeon(s): Surgeon(s) and Role:     * Dhruv Flores MD - Primary   Estimated blood loss: * No values recorded between 6/20/2017 12:00 AM and 6/20/2017  5:03 PM *    Specimens: * No specimens in log *   Findings: normal colonoscopy  See provation procedure note in chart review.    Dhruv Flores MD  [facility]  [phone]

## 2017-10-30 DIAGNOSIS — E55.9 VITAMIN D DEFICIENCY: Primary | ICD-10-CM

## 2017-11-01 DIAGNOSIS — Z76.0 ENCOUNTER FOR MEDICATION REFILL: Primary | ICD-10-CM

## 2017-11-01 NOTE — TELEPHONE ENCOUNTER
Calcium carb 1250mg with D 500mg      Last Written Prescription Date:  na  Last Fill Quantity: na,   # refills: na  Future Office visit:   0    Routing refill request to provider for review/approval because:  Medication is reported/historical

## 2017-11-02 ENCOUNTER — APPOINTMENT (OUTPATIENT)
Dept: GENERAL RADIOLOGY | Facility: CLINIC | Age: 57
End: 2017-11-02
Attending: EMERGENCY MEDICINE
Payer: COMMERCIAL

## 2017-11-02 ENCOUNTER — HOSPITAL ENCOUNTER (EMERGENCY)
Facility: CLINIC | Age: 57
Discharge: NURSING, REHAB OR LONG-TERM CARE | End: 2017-11-02
Attending: EMERGENCY MEDICINE | Admitting: EMERGENCY MEDICINE
Payer: COMMERCIAL

## 2017-11-02 VITALS
BODY MASS INDEX: 24.69 KG/M2 | WEIGHT: 153 LBS | HEART RATE: 95 BPM | TEMPERATURE: 98.6 F | SYSTOLIC BLOOD PRESSURE: 119 MMHG | DIASTOLIC BLOOD PRESSURE: 83 MMHG | OXYGEN SATURATION: 97 % | RESPIRATION RATE: 16 BRPM

## 2017-11-02 DIAGNOSIS — S99.929A INJURY OF NAIL BED OF TOE: ICD-10-CM

## 2017-11-02 DIAGNOSIS — S92.424A CLOSED NONDISPLACED FRACTURE OF DISTAL PHALANX OF RIGHT GREAT TOE, INITIAL ENCOUNTER: ICD-10-CM

## 2017-11-02 DIAGNOSIS — S91.209A AVULSION OF TOENAIL, INITIAL ENCOUNTER: ICD-10-CM

## 2017-11-02 PROCEDURE — 11760 REPAIR OF NAIL BED: CPT

## 2017-11-02 PROCEDURE — 27210282 ZZH ADHESIVE DERMABOND SKIN

## 2017-11-02 PROCEDURE — 73660 X-RAY EXAM OF TOE(S): CPT | Mod: RT

## 2017-11-02 PROCEDURE — 99283 EMERGENCY DEPT VISIT LOW MDM: CPT | Mod: 25

## 2017-11-02 RX ORDER — TRAMADOL HYDROCHLORIDE 50 MG/1
50 TABLET ORAL EVERY 6 HOURS PRN
Qty: 20 TABLET | Refills: 0 | Status: SHIPPED | OUTPATIENT
Start: 2017-11-02

## 2017-11-02 ASSESSMENT — ENCOUNTER SYMPTOMS: WOUND: 1

## 2017-11-02 NOTE — ED AVS SNAPSHOT
Emergency Department    [address] [place] [id number]    Phone:  [phone]    Fax:  [phone]                                       Jeanne Santiago   MRN: [id number]    Department:   Emergency Department   Date of Visit:  11/2/2017           After Visit Summary Signature Page     I have received my discharge instructions, and my questions have been answered. I have discussed any challenges I see with this plan with the nurse or doctor.    ..........................................................................................................................................  Patient/Patient Representative Signature      ..........................................................................................................................................  Patient Representative Print Name and Relationship to Patient    ..................................................               ................................................  Date                                            Time    ..........................................................................................................................................  Reviewed by Signature/Title    ...................................................              ..............................................  Date                                                            Time

## 2017-11-02 NOTE — DISCHARGE INSTRUCTIONS
Detached Fingernail or Toenail  A detached nail is when the nail becomes  from the area underneath it (the nail bed). This often means losing all or part of the nail. An injury to your finger or toe is often the cause. It can also be caused by an infection around or under the nail.  Sometimes there is a cut in the nail bed or a bone fracture under the nail. In most cases, the nail will grow back from the area under the cuticle (the matrix). A fingernail takes about 4 to 6 months to grow back. A toenail takes about 12 months to grow back. If the nail bed or matrix was damaged, the nail may grow back with a rough or abnormal shape. In some cases the nail may not grow back at all.    There may be damage or a cut to the nail bed. This may need to be repaired. Often this is done with stitches. If the nail is still in good condition, it might be cleaned and trimmed, then put back in place. This is done to help and protect the new nail as it grows back. It also prevents the nail bed from drying out.  Home care    Keep the injured part raised to reduce pain and swelling. This is important, especially in the first 48 hours.    Apply an ice pack for up to 20 minutes. Do this every 3 to 6 hours during the first 24 to 48 hours. Keep using ice packs to ease pain and swelling as needed. To make an ice pack, put ice cubes in a plastic bag that seals at the top. Wrap the bag in a clean, thin towel or cloth. Never put ice or an ice pack directly on the skin. The ice pack can be put right on a wrap, cast, or splint. As the ice melts, be careful not to get any wrap, cast, or splint wet.    The nail bed is moist, soft, and sensitive. It needs to be protected from injury for the first 7 to 10 days until it dries out and becomes hard. Keep it covered with a nonstick dressing or a bandage without adhesive.    When a dressing is placed on an exposed nail bed, it may stick and be hard to remove if left in place more than 24  hours. Unless you were told otherwise, change dressings every 24 hours. If needed, soak the dressing off while holding it under warm running water. Then lightly pat the wound dry. Apply a layer of antibiotic ointment before putting on the new dressing or bandage. This will help keep it from sticking. Use a nonstick dressing with the antibiotic ointment under the outer dressing.    If an X-ray showed that you have a fracture, it will take about 4 weeks for this to heal. The injured part should be protected with a splint or tape while it is healing.    If you were given antibiotics to prevent infection, take them as directed until they are all gone.  Medicine    You can take over-the-counter medicine for pain, unless you were given a different pain medicine to use. Talk with your provider before using these medicines if you have chronic liver or kidney disease. Also talk with your provider if you ever had a stomach ulcer or GI bleeding, or are taking blood thinner medicines.    If you were given antibiotics, take them until they are done. It is important to finish the antibiotics even if the wound looks better. This is to make sure the infection has cleared.  Follow-up care  Follow up with your healthcare provider, or as advised. If X-rays were taken, you will be told of any new findings that may affect your care.  When to seek medical advice  Call your healthcare provider right away if any of these occur:    Pain or swelling increases    Redness around the nail    Pus (creamy white or yellow fluid) draining from the nail    Fever of 100.4 F (38 C) or higher, or as directed by your provider  Date Last Reviewed: 8/1/2016 2000-2017 [employer]. [address] [place] [address], [place] [address]. All rights reserved. This information is not intended as a substitute for professional medical care. Always follow your healthcare professional's instructions.

## 2017-11-02 NOTE — ED PROVIDER NOTES
History     Chief Complaint:  Toe Injury    HPI   Jeanne Santiago is a 57 year old female who presents with a toe injury. The patient dropped a glass plate on her right foot this morning, and suffered a wound to her right big toe. The glass plate shattered, though she does not think that any glass got in her toe. Last tetanus was in 2016.    Allergies:  Levaquin     Medications:    Seroquel  Effexor  Rexulti  Multivitamins     Past Medical History:    Schizophrenia  Bipolar 1 disorder  Anxiety    Past Surgical History:    Colonoscopy  Uterine cyst removal  Septoplasty  Tubal ligation    Family History:    Depression  COPD  Cerebrovascular disease  Prostate cancer    Social History:  Relationship status:   Tobacco use: Former smoker (Quit 1991)  Alcohol use: Negative  The patient presents with a friend.     Review of Systems   Skin: Positive for wound.   All other systems reviewed and are negative.      Physical Exam   First Vitals:  BP: 119/83  Pulse: 95  Temp: 98.6  F (37  C)  Resp: 16  Weight: 69.4 kg (153 lb)  SpO2: 97 %    Physical Exam  Constitutional: The patient is oriented to person, place, and time.   HENT:   Head: Atraumatic  Cardiovascular: . Intact distal pulses.    Musculoskeletal: No edema. No bony deformity. Normal range of motion. Avulsion of toe nail of left great toe at the base. Slight oozing of blood. No open laceration. Normal pulses and sensation.  Neurological: The patient is alert and oriented to person, place, and time. The patient has normal strength and normal reflexes. No sensory deficit   Skin: Skin is warm and dry. No rash noted. The patient is not diaphoretic.   Psychiatric: The patient has a normal mood and affect.      Emergency Department Course     Imaging:  Radiographic findings were communicated with the patient who voiced understanding of the findings.    Right Toes XR, per radiology:   Mildly displaced fracture of the distal tuft of the first distal phalanx.      Procedures:     Nail Bed Repair      NAIL BED LACERATION:  A clean 3 cm laceration.    LOCATION:  Left great toe.    FUNCTION:  Distally sensation, circulation, motor and tendon function are intact.    ANESTHESIA:  Digital block using 4 cc's of 0.5% Marcaine.    PREPARATION:  Irrigation and Scrubbing with Shur Clens.    DEBRIDEMENT:  no debridement.    CLOSURE:  Wound was closed with 4 x 6.0 gut sutures. The fingernail was trimmed, cleaned, and reattached using Dermabond      Emergency Department Course:  Nursing notes and vitals reviewed.  I performed an exam of the patient as documented above.  The above workup was undertaken.  0832: I performed the nail bed repair described above.    Findings and plan explained to the Patient. Patient discharged home, status improved, with instructions regarding supportive care, medications, and reasons to return as well as the importance of close follow-up was reviewed.      Impression & Plan      Medical Decision Making:  Jeanne Santiago is a 57 year old female who presents with toe pain after dropping a heavy glass shelf on the top of her toe. On exam, she has avulsed the greater toe nail, and has a 3 cm nail bed laceration with persistent bleeding. Nail bed was repaired per above procedure note. The nail was put back into place and secured with Dermabond. Follow up with [facility]. Tylenol/motrin for pain, and tramadol for severe pain. Return with problems. She is given wound care instructions.     Diagnosis:  1. Avulsion of the left toenail   2. Nailbed injury  3. Non-displaced tuft fracture    Disposition:  Discharge to home with primary care follow up.     Discharge Medications:  tramadol (ULTRAM) 50 MG tablet Take 1 tablet (50 mg) by mouth every 6 hours as needed for moderate pain, Disp-20 tablet, R-0, Local Print     I, Emil Mead, breann serving as a scribe on 11/2/2017 at 7:32 AM to personally document services performed by Dhruv Guidry MD, based on  my observations and the provider's statements to me.     EMERGENCY DEPARTMENT       Dhruv Guidry MD  11/02/17 [id number]

## 2017-11-02 NOTE — ED AVS SNAPSHOT
Emergency Department    [address] [facility] [address]    Phone:  [phone]    Fax:  [phone]                                       Jeanne Santiago   MRN: [id number]    Department:   Emergency Department   Date of Visit:  11/2/2017           Patient Information     Date Of Birth          1960        Your diagnoses for this visit were:     Avulsion of toenail, initial encounter     Injury of nail bed of toe        You were seen by Dhruv Guidry MD.      Follow-up Information     Follow up with [facility].    Contact information:    [address] [phone]        Discharge Instructions         Detached Fingernail or Toenail  A detached nail is when the nail becomes  from the area underneath it (the nail bed). This often means losing all or part of the nail. An injury to your finger or toe is often the cause. It can also be caused by an infection around or under the nail.  Sometimes there is a cut in the nail bed or a bone fracture under the nail. In most cases, the nail will grow back from the area under the cuticle (the matrix). A fingernail takes about 4 to 6 months to grow back. A toenail takes about 12 months to grow back. If the nail bed or matrix was damaged, the nail may grow back with a rough or abnormal shape. In some cases the nail may not grow back at all.    There may be damage or a cut to the nail bed. This may need to be repaired. Often this is done with stitches. If the nail is still in good condition, it might be cleaned and trimmed, then put back in place. This is done to help and protect the new nail as it grows back. It also prevents the nail bed from drying out.  Home care    Keep the injured part raised to reduce pain and swelling. This is important, especially in the first 48 hours.    Apply an ice pack for up to 20 minutes. Do this every 3 to 6 hours during the first 24 to 48 hours. Keep using ice packs  to ease pain and swelling as needed. To make an ice pack, put ice cubes in a plastic bag that seals at the top. Wrap the bag in a clean, thin towel or cloth. Never put ice or an ice pack directly on the skin. The ice pack can be put right on a wrap, cast, or splint. As the ice melts, be careful not to get any wrap, cast, or splint wet.    The nail bed is moist, soft, and sensitive. It needs to be protected from injury for the first 7 to 10 days until it dries out and becomes hard. Keep it covered with a nonstick dressing or a bandage without adhesive.    When a dressing is placed on an exposed nail bed, it may stick and be hard to remove if left in place more than 24 hours. Unless you were told otherwise, change dressings every 24 hours. If needed, soak the dressing off while holding it under warm running water. Then lightly pat the wound dry. Apply a layer of antibiotic ointment before putting on the new dressing or bandage. This will help keep it from sticking. Use a nonstick dressing with the antibiotic ointment under the outer dressing.    If an X-ray showed that you have a fracture, it will take about 4 weeks for this to heal. The injured part should be protected with a splint or tape while it is healing.    If you were given antibiotics to prevent infection, take them as directed until they are all gone.  Medicine    You can take over-the-counter medicine for pain, unless you were given a different pain medicine to use. Talk with your provider before using these medicines if you have chronic liver or kidney disease. Also talk with your provider if you ever had a stomach ulcer or GI bleeding, or are taking blood thinner medicines.    If you were given antibiotics, take them until they are done. It is important to finish the antibiotics even if the wound looks better. This is to make sure the infection has cleared.  Follow-up care  Follow up with your healthcare provider, or as advised. If X-rays were taken, you  will be told of any new findings that may affect your care.  When to seek medical advice  Call your healthcare provider right away if any of these occur:    Pain or swelling increases    Redness around the nail    Pus (creamy white or yellow fluid) draining from the nail    Fever of 100.4 F (38 C) or higher, or as directed by your provider  Date Last Reviewed: 8/1/2016 2000-2017 [employer]. [address] [place] [address], [place] [address]. All rights reserved. This information is not intended as a substitute for professional medical care. Always follow your healthcare professional's instructions.          24 Hour Appointment Hotline       To make an appointment at any [facility], call [phone] ([phone]). If you don't have a family doctor or clinic, we will help you find one. [facility] clinics are conveniently located to serve the needs of you and your family.             Review of your medicines      START taking        Dose / Directions Last dose taken    traMADol 50 MG tablet   Commonly known as:  ULTRAM   Dose:  50 mg   Quantity:  20 tablet        Take 1 tablet (50 mg) by mouth every 6 hours as needed for moderate pain   Refills:  0          Our records show that you are taking the medicines listed below. If these are incorrect, please call your family doctor or clinic.        Dose / Directions Last dose taken    ARTIFICIAL TEARS 1.4 % ophthalmic solution   Dose:  1 drop   Generic drug:  polyvinyl alcohol        Place 1 drop into both eyes every 4 hours as needed for dry eyes   Refills:  0        calcium carb 1250 mg (500 mg [place])/vitamin D 200 units 500-200 MG-UNIT per tablet   Commonly known as:  OSCAL with D   Dose:  1 tablet   Quantity:  180 tablet        Take 1 tablet by mouth 2 times daily   Refills:  3        CERTAVITE/ANTIOXIDANTS PO   Dose:  1 tablet        Take 1 tablet by mouth daily   Refills:  0        * QUEtiapine 50 MG tablet   Commonly known as:  SEROquel        Take by  mouth 2 times daily   Refills:  0        * QUEtiapine 200 MG tablet   Commonly known as:  SEROquel   Dose:  200 mg        Take 200 mg by mouth daily   Refills:  2        * QUEtiapine 100 MG tablet   Commonly known as:  SEROquel   Dose:  100 mg        Take 1 tablet (100 mg) by mouth as needed   Refills:  0        REXULTI 2 MG tablet   Dose:  2 mg   Generic drug:  brexpiprazole        Take 2 mg by mouth daily   Refills:  2        sodium chloride 0.65 % nasal spray   Commonly known as:  OCEAN   Dose:  1 spray        Spray 1 spray into both nostrils every 2 hours as needed for congestion   Refills:  0        * venlafaxine 150 MG Tb24 24 hr tablet   Commonly known as:  EFFEXOR-ER   Dose:  150 mg        Take 150 mg by mouth daily (with breakfast)   Refills:  0        * venlafaxine 75 MG 24 hr capsule   Commonly known as:  EFFEXOR-XR   Dose:  75 mg        Take 75 mg by mouth daily   Refills:  2        vitamin D 2000 UNITS Caps   Dose:  2000 Units        Take 2,000 Units by mouth daily   Refills:  0        * Notice:  This list has 5 medication(s) that are the same as other medications prescribed for you. Read the directions carefully, and ask your doctor or other care provider to review them with you.            Prescriptions were sent or printed at these locations (1 Prescription)                   Other Prescriptions                Printed at Department/Unit printer (1 of 1)         traMADol (ULTRAM) 50 MG tablet                Procedures and tests performed during your visit     XR Toe Right G/E 2 Views      Orders Needing Specimen Collection     None      Pending Results     Date and Time Order Name Status Description    11/2/2017 0736 XR Toe Right G/E 2 Views In process             Pending Culture Results     No orders found from 10/31/2017 to 11/3/2017.            Pending Results Instructions     If you had any lab results that were not finalized at the time of your Discharge, you can call the ED Lab Result RN at  [phone]. You will be contacted by this team for any positive Lab results or changes in treatment. The nurses are available 7 days a week from 10A to 6:30P.  You can leave a message 24 hours per day and they will return your call.        Test Results From Your Hospital Stay              11/2/2017  7:54 AM      Result not yet available     This result will become available once it has been finished.                Clinical Quality Measure: Blood Pressure Screening     Your blood pressure was checked while you were in the emergency department today. The last reading we obtained was  BP: 119/83 . Please read the guidelines below about what these numbers mean and what you should do about them.  If your systolic blood pressure (the top number) is less than 120 and your diastolic blood pressure (the bottom number) is less than 80, then your blood pressure is normal. There is nothing more that you need to do about it.  If your systolic blood pressure (the top number) is 120-139 or your diastolic blood pressure (the bottom number) is 80-89, your blood pressure may be higher than it should be. You should have your blood pressure rechecked within a year by a primary care provider.  If your systolic blood pressure (the top number) is 140 or greater or your diastolic blood pressure (the bottom number) is 90 or greater, you may have high blood pressure. High blood pressure is treatable, but if left untreated over time it can put you at risk for heart attack, stroke, or kidney failure. You should have your blood pressure rechecked by a primary care provider within the next 4 weeks.  If your provider in the emergency department today gave you specific instructions to follow-up with your doctor or provider even sooner than that, you should follow that instruction and not wait for up to 4 weeks for your follow-up visit.        Thank you for choosing [facility]       Thank you for choosing [facility] for your care. Our goal is always  "to provide you with excellent care. Hearing back from our patients is one way we can continue to improve our services. Please take a few minutes to complete the written survey that you may receive in the mail after you visit with us. Thank you!        [employer] Information     [employer] lets you send messages to your doctor, view your test results, renew your prescriptions, schedule appointments and more. To sign up, go to [email or web address] . Click on \"Log in\" on the left side of the screen, which will take you to the Welcome page. Then click on \"Sign up Now\" on the right side of the page.     You will be asked to enter the access code listed below, as well as some personal information. Please follow the directions to create your username and password.     Your access code is: [email or web address]  Expires: 2018  9:20 AM     Your access code will  in 90 days. If you need help or a new code, please call your [facility] clinic or [phone].        Care EveryWhere ID     This is your Care EveryWhere ID. This could be used by other organizations to access your [facility] medical records  [id number]        Equal Access to Services     ZO SMITH : Hadii kenia Pearl, alcira tesfaye, juan carlos barry, kev laguna. So [facility] [phone].    ATENCIÓN: Si habla español, tiene a horowitz disposición servicios gratuitos de asistencia lingüística. Krista al [phone].    We comply with applicable federal civil rights laws and [place] laws. We do not discriminate on the basis of race, color, national origin, age, disability, sex, sexual orientation, or gender identity.            After Visit Summary       This is your record. Keep this with you and show to your community pharmacist(s) and doctor(s) at your next visit.                  "

## 2017-12-12 ENCOUNTER — OFFICE VISIT (OUTPATIENT)
Dept: INTERNAL MEDICINE | Facility: CLINIC | Age: 57
End: 2017-12-12
Payer: COMMERCIAL

## 2017-12-12 VITALS
HEART RATE: 85 BPM | WEIGHT: 155.4 LBS | TEMPERATURE: 98.2 F | SYSTOLIC BLOOD PRESSURE: 120 MMHG | BODY MASS INDEX: 26.53 KG/M2 | OXYGEN SATURATION: 95 % | DIASTOLIC BLOOD PRESSURE: 60 MMHG | HEIGHT: 64 IN

## 2017-12-12 DIAGNOSIS — M79.642 PAIN IN BOTH HANDS: Primary | ICD-10-CM

## 2017-12-12 DIAGNOSIS — K21.9 GASTROESOPHAGEAL REFLUX DISEASE, ESOPHAGITIS PRESENCE NOT SPECIFIED: ICD-10-CM

## 2017-12-12 DIAGNOSIS — M79.641 PAIN IN BOTH HANDS: Primary | ICD-10-CM

## 2017-12-12 DIAGNOSIS — M22.2X2 PATELLOFEMORAL SYNDROME OF BOTH KNEES: ICD-10-CM

## 2017-12-12 DIAGNOSIS — M22.2X1 PATELLOFEMORAL SYNDROME OF BOTH KNEES: ICD-10-CM

## 2017-12-12 PROCEDURE — 99214 OFFICE O/P EST MOD 30 MIN: CPT | Performed by: PHYSICIAN ASSISTANT

## 2017-12-12 RX ORDER — OMEPRAZOLE 40 MG/1
40 CAPSULE, DELAYED RELEASE ORAL DAILY
Qty: 30 CAPSULE | Refills: 1 | Status: SHIPPED | OUTPATIENT
Start: 2017-12-12 | End: 2018-01-10

## 2017-12-12 RX ORDER — GABAPENTIN 100 MG/1
100 CAPSULE ORAL 2 TIMES DAILY
COMMUNITY
Start: 2017-11-15 | End: 2018-11-29

## 2017-12-12 NOTE — MR AVS SNAPSHOT
After Visit Summary   12/12/2017    Jeanne Santiago    MRN: [id number]           Patient Information     Date Of Birth          1960        Visit Information        Provider Department      12/12/2017 1:20 PM Krista Camacho PA-C [facility]        Today's Diagnoses     Pain in both hands    -  1    Patellofemoral syndrome of both knees        Gastroesophageal reflux disease, esophagitis presence not specified          Care Instructions    Follow up with Dr Casas in 3 weeks recheck reflux.               Follow-ups after your visit        Additional Services     [facility] PT, HAND, AND CHIROPRACTIC REFERRAL       **This order will print in the [facility] Scheduling Office**    Physical Therapy, Hand Therapy and Chiropractic Care are available through:    *[facility]  *[facility]  *[facility]    Call one number to schedule at any of the above locations: [phone].    Your provider has referred you to: Hand Therapy    Indication/Reason for Referral: Hand Pain  Onset of Illness: years  Therapy Orders: Evaluate and Treat  Special Programs: None  Special Request: None    Nela Rondon      Additional Comments for the Therapist or Chiropractor:     Please be aware that coverage of these services is subject to the terms and limitations of your health insurance plan.  Call member services at your health plan with any benefit or coverage questions.      Please bring the following to your appointment:    *Your personal calendar for scheduling future appointments  *Comfortable clothing            [facility] PT, HAND, AND CHIROPRACTIC REFERRAL       **This order will print in the [facility] Scheduling Office**    Physical Therapy, Hand Therapy and Chiropractic Care are available through:    *[facility]  *[facility]  *[facility]    Call one number to schedule at any of the above locations:  [phone].    Your provider has referred you to: Physical Therapy at [facility] or [facility]    Indication/Reason for Referral: bilateral knee pain   Onset of Illness: years   Therapy Orders: Evaluate and Treat  Special Programs: None  Special Request: None    Nela Rondon      Additional Comments for the Therapist or Chiropractor:   Pain with going up and down stairs.  + crepitus on exam and with pressure down on the patella and tightening the quads.     Please be aware that coverage of these services is subject to the terms and limitations of your health insurance plan.  Call member services at your health plan with any benefit or coverage questions.      Please bring the following to your appointment:    *Your personal calendar for scheduling future appointments  *Comfortable clothing                  Who to contact     If you have questions or need follow up information about today's clinic visit or your schedule please contact [facility] directly at [phone].  Normal or non-critical lab and imaging results will be communicated to you by MyChart, letter or phone within 4 business days after the clinic has received the results. If you do not hear from us within 7 days, please contact the clinic through [employer] or phone. If you have a critical or abnormal lab result, we will notify you by phone as soon as possible.  Submit refill requests through [employer] or call your pharmacy and they will forward the refill request to us. Please allow 3 business days for your refill to be completed.          Additional Information About Your Visit        [employer] Information     [employer] lets you send messages to your doctor, view your test results, renew your prescriptions, schedule appointments and more. To sign up, go to [email or web address] . Click on \"Log in\" on the left side of the screen, which will take you to the Welcome page. Then click on \"Sign up Now\" on the right side of the page.     You " "will be asked to enter the access code listed below, as well as some personal information. Please follow the directions to create your username and password.     Your access code is: [email or web address]  Expires: 2018  8:20 AM     Your access code will  in 90 days. If you need help or a new code, please call your [facility] clinic or [phone].        Care EveryWhere ID     This is your Care EveryWhere ID. This could be used by other organizations to access your [facility] medical records  [id number]        Your Vitals Were     Pulse Temperature Height Last Period Pulse Oximetry BMI (Body Mass Index)    85 98.2  F (36.8  C) (Oral) 5' 4\" (1.626 m) 2016 (Approximate) 95% 26.67 kg/m2       Blood Pressure from Last 3 Encounters:   17 120/60   17 119/83   17 115/90    Weight from Last 3 Encounters:   17 155 lb 6.4 oz (70.5 kg)   17 153 lb (69.4 kg)   17 143 lb (64.9 kg)              We Performed the Following     [facility] PT, HAND, AND CHIROPRACTIC REFERRAL     [facility] PT, HAND, AND CHIROPRACTIC REFERRAL          Today's Medication Changes          These changes are accurate as of: 17  2:08 PM.  If you have any questions, ask your nurse or doctor.               Start taking these medicines.        Dose/Directions    omeprazole 40 MG capsule   Commonly known as:  priLOSEC   Used for:  Gastroesophageal reflux disease, esophagitis presence not specified   Started by:  Krista Camacho PA-C        Dose:  40 mg   Take 1 capsule (40 mg) by mouth daily Take 30-60 minutes before a meal.   Quantity:  30 capsule   Refills:  1            Where to get your medicines      These medications were sent to [facility] - [address] [address]     Phone:  [phone]     omeprazole 40 MG capsule                Primary Care Provider Fax #    Physician No Ref-Primary [phone]       No address on file        Equal Access to " Services     [facility]: Hadii kenia langston leonardo Pearl, waaxda luqadaha, qaybta kaalmada jhonny, kev hu . So [facility] [phone].    ATENCIÓN: Si habla espfay, tiene a horowitz disposición servicios gratuitos de asistencia lingüística. Llame al [phone].    We comply with applicable federal civil rights laws and [place] laws. We do not discriminate on the basis of race, color, national origin, age, disability, sex, sexual orientation, or gender identity.            Thank you!     Thank you for choosing [facility]  for your care. Our goal is always to provide you with excellent care. Hearing back from our patients is one way we can continue to improve our services. Please take a few minutes to complete the written survey that you may receive in the mail after your visit with us. Thank you!             Your Updated Medication List - Protect others around you: Learn how to safely use, store and throw away your medicines at www.disposemymeds.org.          This list is accurate as of: 12/12/17  2:08 PM.  Always use your most recent med list.                   Brand Name Dispense Instructions for use Diagnosis    ARTIFICIAL TEARS 1.4 % ophthalmic solution   Generic drug:  polyvinyl alcohol      Place 1 drop into both eyes every 4 hours as needed for dry eyes        Calcium carb-Vitamin D 500 mg [place]-200 units 500-200 MG-UNIT per tablet    OSCAL with D;Oyster Shell Calcium    180 tablet    Take 1 tablet by mouth 2 times daily    Encounter for medication refill       CERTAVITE/ANTIOXIDANTS PO      Take 1 tablet by mouth daily        gabapentin 100 MG capsule    NEURONTIN     Take 100 mg by mouth 2 times daily        omeprazole 40 MG capsule    priLOSEC    30 capsule    Take 1 capsule (40 mg) by mouth daily Take 30-60 minutes before a meal.    Gastroesophageal reflux disease, esophagitis presence not specified       * QUEtiapine 50 MG tablet    SEROquel     Take  by mouth 2 times daily        * QUEtiapine 200 MG tablet    SEROquel     Take 200 mg by mouth daily        * QUEtiapine 100 MG tablet    SEROquel     Take 1 tablet (100 mg) by mouth as needed        REXULTI 2 MG tablet   Generic drug:  brexpiprazole      Take 2 mg by mouth daily        sodium chloride 0.65 % nasal spray    OCEAN     Spray 1 spray into both nostrils every 2 hours as needed for congestion        traMADol 50 MG tablet    ULTRAM    20 tablet    Take 1 tablet (50 mg) by mouth every 6 hours as needed for moderate pain        * venlafaxine 150 MG Tb24 24 hr tablet    EFFEXOR-ER     Take 150 mg by mouth daily (with breakfast)        * venlafaxine 75 MG 24 hr capsule    EFFEXOR-XR     Take 75 mg by mouth daily        vitamin D 2000 UNITS Caps      Take 2,000 Units by mouth daily        * Notice:  This list has 5 medication(s) that are the same as other medications prescribed for you. Read the directions carefully, and ask your doctor or other care provider to review them with you.

## 2017-12-12 NOTE — PROGRESS NOTES
SUBJECTIVE:   Jeanne Santiago is a 57 year old female who presents to clinic today for the following health issues:      Gastrointestinal symptoms      Duration: Several years    Description:           REFLUX SYMPTOMS - heartburn, regurgitation of food, acid taste in mouth, belching and vomitting      Intensity:  moderate    Accompanying signs and symptoms:  none    History  Previous similar problem: YES  Previous evaluation:  none    Aggravating factors: none and lying down    Alleviating factors: antacids, proton pump inhibitor - Prilosec and Nexium    Over the counter intermittent use    Other Therapies tried: None    Musculoskeletal problem/pain      Duration: Several years    Description  Location: Bilateral hand joints and knee joint    Intensity:  6/10 at its worst    Accompanying signs and symptoms: none    History  Previous similar problem: YES  Previous evaluation:  x-ray of hands done  [facility]/2016 [facility]  Care everywhere reviewed and xrays negative except for mild spurring at the 1st MCP joint      Precipitating or alleviating factors:  Trauma or overuse: no   Aggravating factors include: YES- Cracking the knuckles    Therapies tried and outcome: Advil and tylenol and it didn't help    Patient seen by PCP Dr Casas for this about 6 months ago. Labs done then negative for Rheumatoid condition.   She did not follow up on it with PCP       -------------------------------------    Problem list and histories reviewed & adjusted, as indicated.  Additional history: as documented    Labs reviewed in EPIC    Reviewed and updated as needed this visit by clinical staffTobacco  Allergies  Soc Hx      Reviewed and updated as needed this visit by Provider  Allergies  Meds         ROS:  Constitutional, HEENT, cardiovascular, pulmonary, gi and gu systems are negative, except as otherwise noted.      OBJECTIVE:   /60 (BP Location: Left arm, Patient Position: Chair, Cuff Size: Adult Regular)  Pulse 85   "Temp 98.2  F (36.8  C) (Oral)  Ht 5' 4\" (1.626 m)  Wt 155 lb 6.4 oz (70.5 kg)  LMP 05/27/2016 (Approximate)  SpO2 95%  BMI 26.67 kg/m2  Body mass index is 26.67 kg/(m^2).  GENERAL: healthy, alert and no distress  RESP: lungs clear to auscultation - no rales, rhonchi or wheezes  CV: regular rate and rhythm, normal S1 S2, no S3 or S4, no murmur, click or rub, no peripheral edema and peripheral pulses strong  ABDOMEN: soft, nontender, no hepatosplenomegaly, no masses and bowel sounds normal  MS: no swelling noted, no redness.   Mild tenderness over PIP joints. Both hands   Knees,   KNEE:  Inspection: No effusion  Tender: no tenderness noted   Non-tender: lateral patellar facet, medial patellar facet, MCL, LCL, lateral joint line, medial joint line  Active Range of Motion: full flexion, full extension, Patellofemoral crepitus with extension  Strength:   Special tests: pain with resisted pressure on the patella     Also examined: hip full range of motion  SKIN: no suspicious lesions or rashes    Diagnostic Test Results:  none     ASSESSMENT/PLAN:             1. Pain in both hands  Referral for hand therapy   - [facility] REFERRAL    2. Patellofemoral syndrome of both knees    - [facility], [facility] CHIR[facility]RACTIC REFERRAL    3. Gastroesophageal reflux disease, esophagitis presence not specified  Trial of higher dosing PPI   - omeprazole (PRILOSEC) 40 MG capsule; Take 1 capsule (40 mg) by mouth daily Take 30-60 minutes before a meal.  Dispense: 30 capsule; Refill: 1    Patient Instructions   Follow up with Dr Casas in 3 weeks recheck reflux.           Krista Camacho PA-C  [facility]    "

## 2017-12-12 NOTE — NURSING NOTE
"Chief Complaint   Patient presents with     Musculoskeletal Problem     x several years     Gastrophageal Reflux     x several years       Initial /60 (BP Location: Left arm, Patient Position: Chair, Cuff Size: Adult Regular)  Pulse 85  Temp 98.2  F (36.8  C) (Oral)  Ht 5' 4\" (1.626 m)  Wt 155 lb 6.4 oz (70.5 kg)  LMP 05/27/2016 (Approximate)  SpO2 95%  BMI 26.67 kg/m2 Estimated body mass index is 26.67 kg/(m^2) as calculated from the following:    Height as of this encounter: 5' 4\" (1.626 m).    Weight as of this encounter: 155 lb 6.4 oz (70.5 kg).  Medication Reconciliation: complete     Kaminibose MA      "

## 2017-12-13 DIAGNOSIS — K21.9 GASTROESOPHAGEAL REFLUX DISEASE, ESOPHAGITIS PRESENCE NOT SPECIFIED: ICD-10-CM

## 2017-12-13 RX ORDER — OMEPRAZOLE 40 MG/1
CAPSULE, DELAYED RELEASE ORAL
Qty: 30 CAPSULE | Refills: 1 | OUTPATIENT
Start: 2017-12-13

## 2017-12-20 RX ORDER — MULTIVIT-MIN/IRON/FOLIC ACID/K 18-600-40
2000 CAPSULE ORAL DAILY
Qty: 90 CAPSULE | Refills: 3 | Status: SHIPPED | OUTPATIENT
Start: 2017-12-20 | End: 2018-11-15

## 2017-12-20 NOTE — TELEPHONE ENCOUNTER
Routing refill request to provider for review/approval because:  Medication is reported/historical          Last Office visit 5/19/17

## 2018-01-10 DIAGNOSIS — K21.9 GASTROESOPHAGEAL REFLUX DISEASE, ESOPHAGITIS PRESENCE NOT SPECIFIED: ICD-10-CM

## 2018-01-10 RX ORDER — OMEPRAZOLE 40 MG/1
CAPSULE, DELAYED RELEASE ORAL
Qty: 30 CAPSULE | Refills: 10 | Status: SHIPPED | OUTPATIENT
Start: 2018-01-10 | End: 2018-11-15

## 2018-02-20 ENCOUNTER — OFFICE VISIT (OUTPATIENT)
Dept: INTERNAL MEDICINE | Facility: CLINIC | Age: 58
End: 2018-02-20
Payer: COMMERCIAL

## 2018-02-20 VITALS
DIASTOLIC BLOOD PRESSURE: 60 MMHG | SYSTOLIC BLOOD PRESSURE: 110 MMHG | HEART RATE: 93 BPM | WEIGHT: 157.6 LBS | BODY MASS INDEX: 26.91 KG/M2 | HEIGHT: 64 IN | TEMPERATURE: 98.3 F | OXYGEN SATURATION: 94 %

## 2018-02-20 DIAGNOSIS — E66.3 OVERWEIGHT (BMI 25.0-29.9): Primary | ICD-10-CM

## 2018-02-20 PROCEDURE — 99213 OFFICE O/P EST LOW 20 MIN: CPT | Performed by: INTERNAL MEDICINE

## 2018-02-20 RX ORDER — BREXPIPRAZOLE 3 MG/1
3 TABLET ORAL DAILY
COMMUNITY
Start: 2018-02-06 | End: 2019-05-02

## 2018-02-20 RX ORDER — TRAZODONE HYDROCHLORIDE 150 MG/1
150 TABLET ORAL AT BEDTIME
COMMUNITY
Start: 2018-02-20

## 2018-02-20 NOTE — PATIENT INSTRUCTIONS
I have placed a nutrition referral for you.    Please schedule an appointment at your earliest convenience - phone number below.    --    Exercise (cardiovascularly vigorous - huffing, puffing, sweating, heart racing) is recommended at least 30 minutes/day, 5 days/week.

## 2018-02-20 NOTE — MR AVS SNAPSHOT
After Visit Summary   2/20/2018    Jeanne Santiago    MRN: [id number]           Patient Information     Date Of Birth          1960        Visit Information        Provider Department      2/20/2018 1:45 PM Noemy Casas MD [facility]        Today's Diagnoses     Overweight (BMI 25.0-29.9)    -  1      Care Instructions    I have placed a nutrition referral for you.    Please schedule an appointment at your earliest convenience - phone number below.    --    Exercise (cardiovascularly vigorous - huffing, puffing, sweating, heart racing) is recommended at least 30 minutes/day, 5 days/week.                   Follow-ups after your visit        Additional Services     NUTRITION REFERRAL       Your provider has referred you to: [facility]: [facility] [phone]   [email or web address]    Please be aware that coverage of these services is subject to the terms and limitations of your health insurance plan.  Call member services at your health plan with any benefit or coverage questions.      Please bring the following with you to your appointment:    (1) This referral request  (2) Any documents given to you regarding this referral  (3) Any specific questions you have about diet and/or food choices                  Your next 10 appointments already scheduled     May 23, 2018 10:00 AM CDT   PHYSICAL with Noemy Casas MD   [facility] ([facility])    [address] [address] [phone]              Who to contact     If you have questions or need follow up information about today's clinic visit or your schedule please contact [facility] directly at [phone].  Normal or non-critical lab and imaging results will be communicated to you by MyChart, letter or phone within 4 business days after the clinic has received the  "results. If you do not hear from us within 7 days, please contact the clinic through EARTHTORY or phone. If you have a critical or abnormal lab result, we will notify you by phone as soon as possible.  Submit refill requests through EARTHTORY or call your pharmacy and they will forward the refill request to us. Please allow 3 business days for your refill to be completed.          Additional Information About Your Visit        EARTHTORY Information     EARTHTORY lets you send messages to your doctor, view your test results, renew your prescriptions, schedule appointments and more. To sign up, go to [email or web address] . Click on \"Log in\" on the left side of the screen, which will take you to the Welcome page. Then click on \"Sign up Now\" on the right side of the page.     You will be asked to enter the access code listed below, as well as some personal information. Please follow the directions to create your username and password.     Your access code is: [email or web address]  Expires: 2018  1:19 PM     Your access code will  in 90 days. If you need help or a new code, please call your [place] clinic or [phone].        Care EveryWhere ID     This is your Care EveryWhere ID. This could be used by other organizations to access your [place] medical records  [id number]        Your Vitals Were     Pulse Temperature Height Last Period Pulse Oximetry BMI (Body Mass Index)    93 98.3  F (36.8  C) (Oral) 5' 4\" (1.626 m) 2016 (Approximate) 94% 27.05 kg/m2       Blood Pressure from Last 3 Encounters:   18 110/60   17 120/60   17 119/83    Weight from Last 3 Encounters:   18 157 lb 9.6 oz (71.5 kg)   17 155 lb 6.4 oz (70.5 kg)   17 153 lb (69.4 kg)              We Performed the Following     NUTRITION REFERRAL          Today's Medication Changes          These changes are accurate as of 18  2:02 PM.  If you have any questions, ask your nurse or doctor.               These " medicines have changed or have updated prescriptions.        Dose/Directions    REXULTI 3 MG tablet   This may have changed:  Another medication with the same name was removed. Continue taking this medication, and follow the directions you see here.   Generic drug:  brexpiprazole   Changed by:  Noemy Casas MD        Dose:  3 mg   Take 3 mg by mouth daily   Refills:  0                Primary Care Provider Fax #    Physician No Ref-Primary [phone]       No address on file        Equal Access to Services     ZO SMITH : Hadii aad ku hadasho Soomaali, waaxda luqadaha, qaybta kaalmada adeegyada, waxay idiin hayaan adeeg kharash laroddy . So [facility] [phone].    ATENCIÓN: Si habla español, tiene a horowitz disposición servicios gratuitos de asistencia lingüística. Llame al [phone].    We comply with applicable federal civil rights laws and [place] laws. We do not discriminate on the basis of race, color, national origin, age, disability, sex, sexual orientation, or gender identity.            Thank you!     Thank you for choosing [facility]  for your care. Our goal is always to provide you with excellent care. Hearing back from our patients is one way we can continue to improve our services. Please take a few minutes to complete the written survey that you may receive in the mail after your visit with us. Thank you!             Your Updated Medication List - Protect others around you: Learn how to safely use, store and throw away your medicines at www.disposemymeds.org.          This list is accurate as of 2/20/18  2:02 PM.  Always use your most recent med list.                   Brand Name Dispense Instructions for use Diagnosis    ARTIFICIAL TEARS 1.4 % ophthalmic solution   Generic drug:  polyvinyl alcohol      Place 1 drop into both eyes every 4 hours as needed for dry eyes        Calcium carb-Vitamin D 500 mg [place]-200 units 500-200 MG-UNIT per tablet    OSCAL with D;Oyster  Shell Calcium    180 tablet    Take 1 tablet by mouth 2 times daily    Encounter for medication refill       CERTAVITE/ANTIOXIDANTS PO      Take 1 tablet by mouth daily        gabapentin 100 MG capsule    NEURONTIN     Take 100 mg by mouth 2 times daily        omeprazole 40 MG capsule    priLOSEC    30 capsule    TAKE 1 CAPSULE BY MOUTH EVERY MORNING BEFORE A MEAL    Gastroesophageal reflux disease, esophagitis presence not specified       REXULTI 3 MG tablet   Generic drug:  brexpiprazole      Take 3 mg by mouth daily        sodium chloride 0.65 % nasal spray    OCEAN     Spray 1 spray into both nostrils every 2 hours as needed for congestion        traMADol 50 MG tablet    ULTRAM    20 tablet    Take 1 tablet (50 mg) by mouth every 6 hours as needed for moderate pain        traZODone 150 MG tablet    DESYREL     Take 150 mg by mouth At Bedtime        * venlafaxine 150 MG Tb24 24 hr tablet    EFFEXOR-ER     Take 150 mg by mouth daily (with breakfast)        * venlafaxine 75 MG 24 hr capsule    EFFEXOR-XR     Take 75 mg by mouth daily        vitamin D 2000 UNITS Caps     90 capsule    Take 2,000 Units by mouth daily    Vitamin D deficiency       * Notice:  This list has 2 medication(s) that are the same as other medications prescribed for you. Read the directions carefully, and ask your doctor or other care provider to review them with you.

## 2018-02-20 NOTE — PROGRESS NOTES
"  SUBJECTIVE:                                                      HPI: Jeanne Santiago is a pleasant 57 year old female who would like to lose weight:    Current weight is 157lbs, BMI 27.     She has lost 8 pounds in the last 1.5 months by eating only 4 pieces of bread each day.    She does not exercise at all.    She would like to weigh 120lbs.    Discussed with patient that a more realistic (and healthy) goal weight for her is 135-140lbs.    Discussed with patient the importance of both a heart healthy diet and exercise together to achieve weight loss.    Patient provided many excuses for why she is unable to exercise including knee pain and financial restraints. I, in turn, proposed multiple alternative options including [employer] memberships, community programs, swimming, stationary bike, and home exercise videos (cardio, weight training, and yoga).    The medication, allergy, and problem lists have been reviewed and updated as appropriate.       OBJECTIVE:                                                      /60 (BP Location: Left arm, Patient Position: Chair, Cuff Size: Adult Regular)  Pulse 93  Temp 98.3  F (36.8  C) (Oral)  Ht 5' 4\" (1.626 m)  Wt 157 lb 9.6 oz (71.5 kg)  LMP 05/27/2016 (Approximate)  SpO2 94%  BMI 27.05 kg/m2  Constitutional: well-appearing  Psych: normal judgment and insight; normal mood and affect; recent and remote memory intact      ASSESSMENT/PLAN:                                                      (E66.3) Overweight (BMI 25.0-29.9)  (primary encounter diagnosis)  Comment: see discussion above.  Plan:    - patient referred to nutrition to discuss weight loss further.   - patient encouraged to start exercising regularly.    The instructions on the AVS were discussed and explained to the patient. Patient expressed understanding of instructions.    (Chart documentation was completed, in part, with [employer] voice-recognition software. Even though reviewed, some grammatical, spelling, " and word errors may remain.)    Noemy Casas MD   [address] [address]  T: [phone], F: [phone]

## 2018-02-23 ENCOUNTER — TELEPHONE (OUTPATIENT)
Dept: NURSING | Facility: CLINIC | Age: 58
End: 2018-02-23

## 2018-02-23 DIAGNOSIS — K21.9 GASTROESOPHAGEAL REFLUX DISEASE, ESOPHAGITIS PRESENCE NOT SPECIFIED: Primary | ICD-10-CM

## 2018-02-23 NOTE — TELEPHONE ENCOUNTER
Patient is having trouble with acid reflux at night. Is wondering if she can take something in the evening to help her. Currently only takes Prilosec in AM. If eats after 5 pm has acid reflux. Says latest she can eat is 330 pm. Please advise.

## 2018-03-09 ENCOUNTER — OFFICE VISIT (OUTPATIENT)
Dept: NUTRITION | Facility: CLINIC | Age: 58
End: 2018-03-09
Payer: MEDICARE

## 2018-03-09 VITALS — WEIGHT: 153.4 LBS | BODY MASS INDEX: 26.33 KG/M2

## 2018-03-09 DIAGNOSIS — E66.3 OVERWEIGHT: Primary | ICD-10-CM

## 2018-03-09 PROCEDURE — 97802 MEDICAL NUTRITION INDIV IN: CPT

## 2018-03-09 NOTE — PATIENT INSTRUCTIONS
- Goal of at least 150 minutes/week of physical activity - walk, working at the horse barn, chair exercise or videos on YouTube   - Work towards more variety in your diet -- more lean protein like chicken, turkey, fish, egg whites & more vegetables   - Continue with three meals/day

## 2018-03-09 NOTE — Clinical Note
HARJINDER. Spent significant time encouraging physical activity.  Thanks for the referral,  Puja Rojas RD, LD

## 2018-03-09 NOTE — PROGRESS NOTES
"Medical Nutrition Therapy  Visit Type:Initial assessment and intervention    Jeanne Santiago presents today for MNT and education related to weight management.   She is accompanied by self.     ASSESSMENT:   Patient comments/concerns relating to nutrition: goal of getting to weight of 135 lbs, lactose intolerant & high cholesterol. She feels her medications have caused significant weight gain recently. She is upset that she is now a size 14 when she used to be a size 10. She feels that she even when she eats very little, she does not lose weight.     NUTRITION HISTORY:  Jeanne eats a very restrictive diet, almost exclusively Eggo cinnamon toast waffles without butter or syrup. She does not mind eating the same foods each day    Misses meals? No  Eats out:  Seldom, her lunch on day 3 above was out with a friend and she states she \"pigged out\" but generally never eats out    Previous diet education:  No     Food allergies/intolerances: None    Diet is low in: protein    EXERCISE: no regular exercise program    SOCIO/ECONOMIC:   Lives with: self,   ~3 years ago    MEDICATIONS:  Current Outpatient Prescriptions   Medication     ranitidine (ZANTAC) 300 MG tablet     traZODone (DESYREL) 150 MG tablet     REXULTI 3 MG tablet     omeprazole (PRILOSEC) 40 MG capsule     Cholecalciferol (VITAMIN D) 2000 UNITS CAPS     gabapentin (NEURONTIN) 100 MG capsule     traMADol (ULTRAM) 50 MG tablet     calcium carb 1250 mg, 500 mg Hoh,/vitamin D 200 units (OSCAL WITH D) 500-200 MG-UNIT per tablet     venlafaxine (EFFEXOR-XR) 75 MG 24 hr capsule     sodium chloride (OCEAN) 0.65 % nasal spray     Multiple Vitamins-Minerals (CERTAVITE/ANTIOXIDANTS PO)     venlafaxine (EFFEXOR-ER) 150 MG TB24     polyvinyl alcohol (ARTIFICIAL TEARS) 1.4 % ophthalmic solution     No current facility-administered medications for this visit.        LABS:  Last Basic Metabolic Panel:  Lab Results   Component Value Date     2016      Lab " Results   Component Value Date    POTASSIUM 4.1 06/17/2016     Lab Results   Component Value Date    CHLORIDE 106 06/17/2016     Lab Results   Component Value Date    LUIZ 8.9 06/17/2016     Lab Results   Component Value Date    CO2 30 06/17/2016     Lab Results   Component Value Date    BUN 16 06/17/2016     Lab Results   Component Value Date    CR 0.64 06/17/2016     Lab Results   Component Value Date    GLC 97 05/19/2017       ANTHROPOMETRICS:  Vitals: LMP 05/27/2016 (Approximate)  There is no height or weight on file to calculate BMI.     Wt Readings from Last 5 Encounters:   03/09/18 69.6 kg (153 lb 6.4 oz)   02/20/18 71.5 kg (157 lb 9.6 oz)   12/12/17 70.5 kg (155 lb 6.4 oz)   11/02/17 69.4 kg (153 lb)   06/20/17 64.9 kg (143 lb)     Weight Change: weight loss since last visit may be partially due to pt wearing boots & coat at time weight was taken on 2/20.    NUTRITION DIAGNOSIS: Food- and nutrition-related knowledge deficit related to no previous diet education as evidenced by disordered eating pattern    NUTRITION INTERVENTION:  Education given to support: weight reduction, consistent meals, portion control, heart healthy diet and MyPlate planning method  Education Materials Provided: My Plate Planner/Choose My Plate, Chair Exercise brochure, [employer] Exercise & Physical Activity guide  Motivational Interviewing  Referral to care team member: to discuss medication & their side effect of weight gain    Spent significant time discussing need for two prong approach - both diet & exercise - to help with weight loss. Strongly encouraged minimum of 150 minutes/week physical activity and slowly increasing from there. 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 This will help her to reach this goal. Reviewed other activities including chair exercise, walking, and provided resources to help patient meet this goal.     PATIENT'S BEHAVIOR CHANGE GOALS:   Goal of 150 minutes/week physical  activity   Goal of more variety in diet including lean protein & more vegetable intake   Continue meals TID, do not skip meals  AVS was printed and given to patient at today's appointment.    MONITOR / EVALUATE:  RD will monitor/evaluate:  Food and nutrition knowledge / skills  Food / Beverage / Nutrient intake   Weight change  Activity adoption    FOLLOW-UP:  Call RD with questions/concerns.   Follow-up appointment scheduled on 5/18/18.     Puja Rojas RD, LD  Time spent in minutes: 45  Encounter: Individual

## 2018-05-21 ENCOUNTER — TRANSFERRED RECORDS (OUTPATIENT)
Dept: HEALTH INFORMATION MANAGEMENT | Facility: CLINIC | Age: 58
End: 2018-05-21

## 2018-05-22 ENCOUNTER — TRANSFERRED RECORDS (OUTPATIENT)
Dept: HEALTH INFORMATION MANAGEMENT | Facility: CLINIC | Age: 58
End: 2018-05-22

## 2018-10-17 ENCOUNTER — ANESTHESIA - HEALTHEAST (OUTPATIENT)
Dept: SURGERY | Facility: CLINIC | Age: 58
End: 2018-10-17

## 2018-10-19 ENCOUNTER — COMMUNICATION - HEALTHEAST (OUTPATIENT)
Dept: BEHAVIORAL HEALTH | Facility: CLINIC | Age: 58
End: 2018-10-19

## 2018-10-19 ENCOUNTER — ANESTHESIA - HEALTHEAST (OUTPATIENT)
Dept: SURGERY | Facility: CLINIC | Age: 58
End: 2018-10-19

## 2018-10-21 ENCOUNTER — ANESTHESIA - HEALTHEAST (OUTPATIENT)
Dept: SURGERY | Facility: CLINIC | Age: 58
End: 2018-10-21

## 2018-10-24 ENCOUNTER — HOSPITAL ENCOUNTER (INPATIENT)
Dept: SURGERY | Facility: CLINIC | Age: 58
Discharge: STILL ADMITTED OR ADMINISTRATIVE | End: 2018-10-24
Attending: ANESTHESIOLOGY
Payer: MEDICARE

## 2018-10-24 ENCOUNTER — ANESTHESIA - HEALTHEAST (OUTPATIENT)
Dept: SURGERY | Facility: CLINIC | Age: 58
End: 2018-10-24

## 2018-10-24 DIAGNOSIS — F25.1 SCHIZOAFFECTIVE DISORDER, DEPRESSIVE TYPE (H): ICD-10-CM

## 2018-10-26 ENCOUNTER — COMMUNICATION - HEALTHEAST (OUTPATIENT)
Dept: BEHAVIORAL HEALTH | Facility: CLINIC | Age: 58
End: 2018-10-26

## 2018-10-26 ENCOUNTER — ANESTHESIA - HEALTHEAST (OUTPATIENT)
Dept: SURGERY | Facility: CLINIC | Age: 58
End: 2018-10-26

## 2018-10-27 ENCOUNTER — ANESTHESIA - HEALTHEAST (OUTPATIENT)
Dept: SURGERY | Facility: CLINIC | Age: 58
End: 2018-10-27

## 2018-10-31 ENCOUNTER — ANESTHESIA - HEALTHEAST (OUTPATIENT)
Dept: SURGERY | Facility: CLINIC | Age: 58
End: 2018-10-31

## 2018-11-15 DIAGNOSIS — K21.9 GASTROESOPHAGEAL REFLUX DISEASE, ESOPHAGITIS PRESENCE NOT SPECIFIED: ICD-10-CM

## 2018-11-15 DIAGNOSIS — E55.9 VITAMIN D DEFICIENCY: ICD-10-CM

## 2018-11-15 RX ORDER — ACETAMINOPHEN 160 MG
TABLET,DISINTEGRATING ORAL
Qty: 28 CAPSULE | Refills: 3 | Status: SHIPPED | OUTPATIENT
Start: 2018-11-15 | End: 2019-03-08

## 2018-11-15 RX ORDER — OMEPRAZOLE 40 MG/1
CAPSULE, DELAYED RELEASE ORAL
Qty: 28 CAPSULE | Refills: 3 | Status: SHIPPED | OUTPATIENT
Start: 2018-11-15 | End: 2019-03-08

## 2018-11-15 NOTE — TELEPHONE ENCOUNTER
"Requested Prescriptions   Pending Prescriptions Disp Refills     Cholecalciferol (VITAMIN D3) 2000 units CAPS [Pharmacy Med Name: VITAMIN D3 2000 UNIT CAP] 28 capsule      Sig: TAKE 1 CAPSULE BY MOUTH EVERY MORNING    Vitamin Supplements (Adult) Protocol Passed    11/15/2018 11:38 AM       Passed - High dose Vitamin D not ordered       Passed - Recent (12 mo) or future (30 days) visit within the authorizing provider's specialty    Patient had office visit in the last 12 months or has a visit in the next 30 days with authorizing provider or within the authorizing provider's specialty.  See \"Patient Info\" tab in inRealtime [place], or \"Choose Columns\" in Meds & Orders section of the refill encounter.              omeprazole (PRILOSEC) 40 MG capsule [Pharmacy Med Name: OMEPRAZOLE 40MG CAP] 28 capsule      Sig: TAKE 1 CAPSULE BY MOUTH EVERY MORNING BEFORE MORNING MEAL    PPI Protocol Passed    11/15/2018 11:38 AM       Passed - Not on Clopidogrel (unless Pantoprazole ordered)       Passed - No diagnosis of osteoporosis on record       Passed - Recent (12 mo) or future (30 days) visit within the authorizing provider's specialty    Patient had office visit in the last 12 months or has a visit in the next 30 days with authorizing provider or within the authorizing provider's specialty.  See \"Patient Info\" tab in inbasket, or \"Choose Columns\" in Meds & Orders section of the refill encounter.             Passed - Patient is age 18 or older       Passed - No active pregnacy on record       Passed - No positive pregnancy test in past 12 months          "

## 2018-11-16 ENCOUNTER — COMMUNICATION - HEALTHEAST (OUTPATIENT)
Dept: BEHAVIORAL HEALTH | Facility: CLINIC | Age: 58
End: 2018-11-16

## 2018-11-29 ENCOUNTER — OFFICE VISIT (OUTPATIENT)
Dept: INTERNAL MEDICINE | Facility: CLINIC | Age: 58
End: 2018-11-29
Payer: MEDICARE

## 2018-11-29 VITALS
HEART RATE: 100 BPM | RESPIRATION RATE: 18 BRPM | SYSTOLIC BLOOD PRESSURE: 106 MMHG | TEMPERATURE: 98.4 F | BODY MASS INDEX: 28.99 KG/M2 | WEIGHT: 168.9 LBS | OXYGEN SATURATION: 96 % | DIASTOLIC BLOOD PRESSURE: 76 MMHG

## 2018-11-29 DIAGNOSIS — Z23 NEED FOR PROPHYLACTIC VACCINATION AND INOCULATION AGAINST INFLUENZA: ICD-10-CM

## 2018-11-29 DIAGNOSIS — Z12.31 ENCOUNTER FOR SCREENING MAMMOGRAM FOR BREAST CANCER: ICD-10-CM

## 2018-11-29 DIAGNOSIS — Z00.00 ROUTINE HISTORY AND PHYSICAL EXAMINATION OF ADULT: Primary | ICD-10-CM

## 2018-11-29 PROCEDURE — 99396 PREV VISIT EST AGE 40-64: CPT | Mod: 25 | Performed by: INTERNAL MEDICINE

## 2018-11-29 PROCEDURE — G0008 ADMIN INFLUENZA VIRUS VAC: HCPCS | Performed by: INTERNAL MEDICINE

## 2018-11-29 PROCEDURE — 90682 RIV4 VACC RECOMBINANT DNA IM: CPT | Performed by: INTERNAL MEDICINE

## 2018-11-29 NOTE — MR AVS SNAPSHOT
"              After Visit Summary   11/29/2018    Jeanne Santiago    MRN: [id number]           Patient Information     Date Of Birth          1960        Visit Information        Provider Department      11/29/2018 1:45 PM Noemy Casas MD [facility]        Today's Diagnoses     Encounter for screening mammogram for breast cancer    -  1    Need for prophylactic vaccination and inoculation against influenza          Care Instructions    Flu shot today.    ---    Shingles vaccine DUE - recommend getting at pharmacy (if you're going to get - may or may not be covered).     ---    Please schedule mammogram on the way out today.           Follow-ups after your visit        Future tests that were ordered for you today     Open Future Orders        Priority Expected Expires Ordered    MA Screening Digital Bilateral Routine  11/30/2019 11/29/2018            Who to contact     If you have questions or need follow up information about today's clinic visit or your schedule please contact [facility] directly at [phone].  Normal or non-critical lab and imaging results will be communicated to you by BeG[employer]art, letter or phone within 4 business days after the clinic has received the results. If you do not hear from us within 7 days, please contact the clinic through [facility] or phone. If you have a critical or abnormal lab result, we will notify you by phone as soon as possible.  Submit refill requests through [employer] or call your pharmacy and they will forward the refill request to us. Please allow 3 business days for your refill to be completed.          Additional Information About Your Visit        BeGohart Information     [employer] lets you send messages to your doctor, view your test results, renew your prescriptions, schedule appointments and more. To sign up, go to [email or web address] . Click on \"Log in\" on the left side of the screen, which will take you to the " "Welcome page. Then click on \"Sign up Now\" on the right side of the page.     You will be asked to enter the access code listed below, as well as some personal information. Please follow the directions to create your username and password.     Your access code is: [email or web address]  Expires: 2019  2:16 PM     Your access code will  in 90 days. If you need help or a new code, please call your [facility] clinic or [phone].        Care EveryWhere ID     This is your Care EveryWhere ID. This could be used by other organizations to access your [facility] medical records  [id number]        Your Vitals Were     Pulse Temperature Respirations Last Period Pulse Oximetry BMI (Body Mass Index)    100 98.4  F (36.9  C) (Oral) 18 2016 (Approximate) 96% 28.99 kg/m2       Blood Pressure from Last 3 Encounters:   18 106/76   18 110/60   17 120/60    Weight from Last 3 Encounters:   18 168 lb 14.4 oz (76.6 kg)   18 153 lb 6.4 oz (69.6 kg)   18 157 lb 9.6 oz (71.5 kg)              We Performed the Following     FLU VACCINE, (RIV4) RECOMBINANT HA  , IM (FluBlok, egg free) [06805]- >18 YRS (G recommended  50-64 YRS)     Vaccine Administration, Initial [11007]        Primary Care Provider Fax #    Physician No Ref-Primary [phone]       No address on file        Equal Access to Services     [facility] : Hadii kenia carmonao Sonatalia, waaxda luqadaha, qaybta kaalmada adejennifer, kev laguna. So [facility] [phone].    ATENCIÓN: Si habla español, tiene a horowitz disposición servicios gratuitos de asistencia lingüística. Llame al [phone].    We comply with applicable federal civil rights laws and [place] laws. We do not discriminate on the basis of race, color, national origin, age, disability, sex, sexual orientation, or gender identity.            Thank you!     Thank you for choosing [facility]  for your care. Our goal is " always to provide you with excellent care. Hearing back from our patients is one way we can continue to improve our services. Please take a few minutes to complete the written survey that you may receive in the mail after your visit with us. Thank you!             Your Updated Medication List - Protect others around you: Learn how to safely use, store and throw away your medicines at www.disposemymeds.org.          This list is accurate as of 11/29/18  2:16 PM.  Always use your most recent med list.                   Brand Name Dispense Instructions for use Diagnosis    BUSPAR PO      Take 5 mg by mouth 3 times daily        HALOPERIDOL PO      Take 1 mg by mouth 2 times daily        omeprazole 40 MG DR capsule    priLOSEC    28 capsule    TAKE 1 CAPSULE BY MOUTH EVERY MORNING BEFORE MORNING MEAL    Gastroesophageal reflux disease, esophagitis presence not specified       REXULTI 3 MG tablet   Generic drug:  brexpiprazole      Take 3 mg by mouth daily        traMADol 50 MG tablet    ULTRAM    20 tablet    Take 1 tablet (50 mg) by mouth every 6 hours as needed for moderate pain        traZODone 150 MG tablet    DESYREL     Take 150 mg by mouth At Bedtime        * venlafaxine 150 MG 24 hr tablet    EFFEXOR-ER     Take 150 mg by mouth daily (with breakfast)        * venlafaxine 75 MG 24 hr capsule    EFFEXOR-XR     Take 75 mg by mouth daily        vitamin D3 2000 units Caps     28 capsule    TAKE 1 CAPSULE BY MOUTH EVERY MORNING    Vitamin D deficiency       * Notice:  This list has 2 medication(s) that are the same as other medications prescribed for you. Read the directions carefully, and ask your doctor or other care provider to review them with you.

## 2018-11-29 NOTE — PROGRESS NOTES
SUBJECTIVE:                                                      HPI: Jeanne Santiago is a pleasant 58 year old female who presents for a physical.    Accompanied by group home staff member.     No specific complaints, concerns, or questions.    ROS:  Constitutional: denies unintentional weight loss or gain; denies fevers, chills, or sweats     Cardiovascular: denies chest pain, palpitations, or edema  Respiratory: denies cough, wheezing, shortness of breath, or dyspnea on exertion  Gastrointestinal: denies nausea, vomiting, constipation, diarrhea, or abdominal pain  Genitourinary: denies urinary frequency, urgency, dysuria, or hematuria  Integumentary: denies rash or pruritus  Musculoskeletal: denies back pain, muscle pain, joint pain, or joint swelling  Neurologic: denies focal weakness, numbness, or tingling  Hematologic/Immunologic: denies history of anemia or blood transfusions  Endocrine: denies heat or cold intolerance; denies polyuria, polydipsia  Psychiatric: see PMH below    Past Medical History:   Diagnosis Date     Anxiety      Bipolar 1 disorder (H)      Schizophrenia (H)      Past Surgical History:   Procedure Laterality Date     COLONOSCOPY  8/22/2011    Procedure:COLONOSCOPY; colonoscopy; Surgeon:DONNA SANCHES; Location: GI     COLONOSCOPY N/A 6/20/2017    Procedure: COLONOSCOPY;  colonoscopy;  Surgeon: Dhruv Flores MD;  Location:  GI     GYN SURGERY      uterine cyst removal x3     SEPTOPLASTY  2002    for broken nose     TUBAL LIGATION  1984     Family History   Problem Relation Age of Onset     Depression Mother      Chronic Obstructive Pulmonary Disease Mother      smoker     Cerebrovascular Disease Father      later in life     Prostate Cancer Father      Cervical Cancer Paternal Aunt      Myocardial Infarction Paternal Grandfather      Type 2 Diabetes Paternal Grandmother      Aneurysm Paternal Grandmother      Breast Cancer No family hx of      Ovarian Cancer No family hx  of      Colon Cancer No family hx of      Occupational History     Not working currently      Social History Main Topics     Smoking status: Former Smoker     Packs/day: 1.00     Years: 8.00     Types: Cigarettes     Quit date: 1/21/1991     Smokeless tobacco: Never Used     Alcohol use No     Drug use: No     Sexual activity: No     Social History Narrative    . Currently single.    3 adult kids.    2 step grand kids.     No formal exercise.     Allergies   Allergen Reactions     Levaquin Itching     Current Outpatient Prescriptions   Medication Sig     BusPIRone HCl (BUSPAR PO) Take 5 mg by mouth 3 times daily     Cholecalciferol (VITAMIN D3) 2000 units CAPS TAKE 1 CAPSULE BY MOUTH EVERY MORNING     HALOPERIDOL PO Take 1 mg by mouth 2 times daily     omeprazole (PRILOSEC) 40 MG capsule TAKE 1 CAPSULE BY MOUTH EVERY MORNING BEFORE MORNING MEAL     REXULTI 3 MG tablet Take 3 mg by mouth daily     traMADol (ULTRAM) 50 MG tablet Take 1 tablet (50 mg) by mouth every 6 hours as needed for moderate pain     traZODone (DESYREL) 150 MG tablet Take 150 mg by mouth At Bedtime     venlafaxine (EFFEXOR-ER) 150 MG TB24 Take 150 mg by mouth daily (with breakfast)     venlafaxine (EFFEXOR-XR) 75 MG 24 hr capsule Take 75 mg by mouth daily     Immunization History   Administered Date(s) Administered     Influenza (IIV3) PF 09/22/2010     TD (ADULT, 7+) 12/24/1999     Tdap (Adacel,Boostrix) 11/01/2009     OBJECTIVE:                                                      /76  Pulse 100  Temp 98.4  F (36.9  C) (Oral)  Resp 18  Wt 168 lb 14.4 oz (76.6 kg)  LMP 05/27/2016 (Approximate)  SpO2 96%  BMI 28.99 kg/m2  Constitutional: well-appearing  Eyes: normal conjunctivae and lids; pupils equal, round, and reactive to light  Ears, Nose, Mouth, and Throat: normal ears and nose; tympanic membranes visualized and normal; normal lips, teeth, and gums; no oropharyngeal lesions or ulcers  Neck: supple and symmetric; no  lymphadenopathy; no thyromegaly or masses  Respiratory: normal respiratory effort; clear to auscultation bilaterally  Cardiovascular: regular rate and rhythm; pedal pulses palpable; no edema  Gastrointestinal: soft, non-tender, non-distended, and bowel sounds present; no organomegaly or masses  Musculoskeletal: normal gait and station  Psych: normal judgment and insight; normal mood and affect; recent and remote memory intact; oriented to time, place, and person    PREVENTATIVE HEALTH                                                      BMI: overweight  Blood pressure: within normal limits   Breast CA screening: DUE  Cervical CA screening: last pap performed 2016; report reviewed and was normal; repeat due in 2021  Colon CA screening: last performed 2017; report reviewed; repeat due in 2027  Lung CA screening: n/a   Dexa: not medically indicated at this time   Screening HCV: completed  Screening HIV: completed  Screening cholesterol: up to date   Screening diabetes: up to date   STD testing: no risk factors present  Alcohol misuse screening: alcohol use reviewed - no intervention indicated at this time  Immunizations: reviewed; flu shot and shingles series DUE    ASSESSMENT/PLAN:                                                       (Z00.00) Routine history and physical examination of adult  (primary encounter diagnosis)  Comment: PMH, PSH, FH, SH, medications, allergies, immunizations, and preventative health measures reviewed.   Plan: see below for plans.     (Z12.31) Encounter for screening mammogram for breast cancer  Plan: mammogram ordered - patient to schedule.     (Z23) Need for prophylactic vaccination and inoculation against influenza  Plan: flu shot given today.     The instructions on the AVS were discussed and explained to the patient. Patient expressed understanding of instructions.    (Chart documentation was completed, in part, with [employer] voice-recognition software. Even though reviewed, some  grammatical, spelling, and word errors may remain.)    Noemy Casas MD   [address] [address]  T: [phone], F: [phone]

## 2018-11-29 NOTE — PATIENT INSTRUCTIONS
Flu shot today.    ---    Shingles vaccine DUE - recommend getting at pharmacy (if you're going to get - may or may not be covered).     ---    Please schedule mammogram on the way out today.

## 2018-12-12 ENCOUNTER — HOSPITAL ENCOUNTER (OUTPATIENT)
Dept: MAMMOGRAPHY | Facility: CLINIC | Age: 58
Discharge: HOME | End: 2018-12-12
Attending: INTERNAL MEDICINE | Admitting: INTERNAL MEDICINE
Payer: MEDICARE

## 2018-12-12 DIAGNOSIS — Z12.31 ENCOUNTER FOR SCREENING MAMMOGRAM FOR BREAST CANCER: ICD-10-CM

## 2018-12-12 PROCEDURE — 77067 SCR MAMMO BI INCL CAD: CPT

## 2019-01-25 ENCOUNTER — TRANSFERRED RECORDS (OUTPATIENT)
Dept: HEALTH INFORMATION MANAGEMENT | Facility: CLINIC | Age: 59
End: 2019-01-25

## 2019-02-20 ENCOUNTER — MEDICAL CORRESPONDENCE (OUTPATIENT)
Dept: HEALTH INFORMATION MANAGEMENT | Facility: CLINIC | Age: 59
End: 2019-02-20

## 2019-02-20 DIAGNOSIS — F03.90 SENILE DEMENTIA, UNCOMPLICATED (H): ICD-10-CM

## 2019-02-20 DIAGNOSIS — R41.89 AKINETIC MUTISM: Primary | ICD-10-CM

## 2019-02-20 DIAGNOSIS — G93.40 ENCEPHALOPATHY, UNSPECIFIED: ICD-10-CM

## 2019-03-08 DIAGNOSIS — K21.9 GASTROESOPHAGEAL REFLUX DISEASE, ESOPHAGITIS PRESENCE NOT SPECIFIED: ICD-10-CM

## 2019-03-08 DIAGNOSIS — E55.9 VITAMIN D DEFICIENCY: ICD-10-CM

## 2019-03-08 RX ORDER — OMEPRAZOLE 40 MG/1
CAPSULE, DELAYED RELEASE ORAL
Qty: 28 CAPSULE | Refills: 7 | Status: SHIPPED | OUTPATIENT
Start: 2019-03-08

## 2019-03-08 RX ORDER — ACETAMINOPHEN 160 MG
TABLET,DISINTEGRATING ORAL
Qty: 28 CAPSULE | Refills: 7 | Status: SHIPPED | OUTPATIENT
Start: 2019-03-08

## 2019-03-08 NOTE — TELEPHONE ENCOUNTER
"Requested Prescriptions   Pending Prescriptions Disp Refills     Cholecalciferol (VITAMIN D3) 2000 units CAPS [Pharmacy Med Name: VITAMIN D3 2000 UNIT CAP] 28 capsule 3     Sig: TAKE 1 CAPSULE BY MOUTH EVERY MORNING    Vitamin Supplements (Adult) Protocol Passed - 3/8/2019 12:28 PM       Passed - High dose Vitamin D not ordered       Passed - Recent (12 mo) or future (30 days) visit within the authorizing provider's specialty    Patient had office visit in the last 12 months or has a visit in the next 30 days with authorizing provider or within the authorizing provider's specialty.  See \"Patient Info\" tab in inbasket, or \"Choose Columns\" in Meds & Orders section of the refill encounter.             Passed - Medication is active on med list        omeprazole (PRILOSEC) 40 MG DR capsule [Pharmacy Med Name: OMEPRAZOLE 40MG CR CAP] 28 capsule 3     Sig: TAKE 1 CAPSULE BY MOUTH EVERY MORNING BEFORE MORNING MEAL    PPI Protocol Passed - 3/8/2019 12:28 PM       Passed - Not on Clopidogrel (unless Pantoprazole ordered)       Passed - No diagnosis of osteoporosis on record       Passed - Recent (12 mo) or future (30 days) visit within the authorizing provider's specialty    Patient had office visit in the last 12 months or has a visit in the next 30 days with authorizing provider or within the authorizing provider's specialty.  See \"Patient Info\" tab in inbasket, or \"Choose Columns\" in Meds & Orders section of the refill encounter.             Passed - Medication is active on med list       Passed - Patient is age 18 or older       Passed - No active pregnacy on record       Passed - No positive pregnancy test in past 12 months          "

## 2019-03-13 ENCOUNTER — OFFICE VISIT (OUTPATIENT)
Dept: URGENT CARE | Facility: URGENT CARE | Age: 59
End: 2019-03-13
Payer: MEDICARE

## 2019-03-13 VITALS
HEART RATE: 83 BPM | DIASTOLIC BLOOD PRESSURE: 80 MMHG | SYSTOLIC BLOOD PRESSURE: 110 MMHG | OXYGEN SATURATION: 97 % | TEMPERATURE: 98.2 F

## 2019-03-13 DIAGNOSIS — L03.114 CELLULITIS OF LEFT UPPER EXTREMITY: Primary | ICD-10-CM

## 2019-03-13 PROCEDURE — 99213 OFFICE O/P EST LOW 20 MIN: CPT | Performed by: PHYSICIAN ASSISTANT

## 2019-03-13 RX ORDER — CEPHALEXIN 500 MG/1
500 CAPSULE ORAL 3 TIMES DAILY
Qty: 30 CAPSULE | Refills: 0 | Status: SHIPPED | OUTPATIENT
Start: 2019-03-13 | End: 2019-05-02

## 2019-03-13 NOTE — PROGRESS NOTES
Patient presents with:  Urgent Care: injection site may be infected     SUBJECTIVE:  Jeanne Santiago is a 58 year old female who presents to the clinic today for a red and tender area on her left upper arm for the past few days.  Per care attendant she had an injection in the area recently.  Denies any fevers.        Past Medical History:   Diagnosis Date     Anxiety      Bipolar 1 disorder (H)      Schizophrenia (H)         Allergies   Allergen Reactions     Levaquin Itching     Social History     Tobacco Use     Smoking status: Former Smoker     Packs/day: 1.00     Years: 8.00     Pack years: 8.00     Types: Cigarettes     Last attempt to quit: 1991     Years since quittin.1     Smokeless tobacco: Never Used   Substance Use Topics     Alcohol use: No     Alcohol/week: 0.0 oz       ROS:  CONSTITUTIONAL:NEGATIVE for fever, chills, change in weight  INTEGUMENTARY/SKIN: as per HPI  MUSCULOSKELETAL: as per HPI  NEURO: NEGATIVE for weakness, dizziness or paresthesias  Review of systems negative except as stated above.    EXAM:   /80   Pulse 83   Temp 98.2  F (36.8  C) (Tympanic)   LMP 2016 (Approximate)   SpO2 97%   GENERAL: alert, no acute distress.  SKIN:   Erythematous skin on left upper arm with induration.  Area of induration and erythema is about 1.5cm.  No vesicles.    Extremity: no edema.  FROM      (L03.114) Cellulitis of left upper extremity  (primary encounter diagnosis)  Comment: left upper arm  Plan: cephALEXin (KEFLEX) 500 MG capsule        Warm compress to area 20 minutes 3 times a day.    Follow up with primary clinic should symptoms persist or worsen.      Patient's caregiver expresses understanding and agreement with the assessment and plan as above.

## 2019-03-13 NOTE — PATIENT INSTRUCTIONS
(L03.114) Cellulitis of left upper extremity  (primary encounter diagnosis)  Comment: left upper arm  Plan: cephALEXin (KEFLEX) 500 MG capsule        Warm compress to area 20 minutes 3 times a day.    Follow up with primary clinic should symptoms persist or worsen.

## 2019-03-15 ENCOUNTER — TRANSFERRED RECORDS (OUTPATIENT)
Dept: HEALTH INFORMATION MANAGEMENT | Facility: CLINIC | Age: 59
End: 2019-03-15

## 2019-05-02 ENCOUNTER — MEDICAL CORRESPONDENCE (OUTPATIENT)
Dept: HEALTH INFORMATION MANAGEMENT | Facility: CLINIC | Age: 59
End: 2019-05-02

## 2019-05-02 ENCOUNTER — OFFICE VISIT (OUTPATIENT)
Dept: INTERNAL MEDICINE | Facility: CLINIC | Age: 59
End: 2019-05-02
Payer: MEDICARE

## 2019-05-02 VITALS
BODY MASS INDEX: 26.85 KG/M2 | HEART RATE: 90 BPM | RESPIRATION RATE: 18 BRPM | SYSTOLIC BLOOD PRESSURE: 100 MMHG | OXYGEN SATURATION: 99 % | DIASTOLIC BLOOD PRESSURE: 70 MMHG | WEIGHT: 156.4 LBS

## 2019-05-02 DIAGNOSIS — R41.3 MEMORY CHANGES: ICD-10-CM

## 2019-05-02 DIAGNOSIS — Z00.00 ROUTINE HISTORY AND PHYSICAL EXAMINATION OF ADULT: Primary | ICD-10-CM

## 2019-05-02 PROCEDURE — 99396 PREV VISIT EST AGE 40-64: CPT | Performed by: INTERNAL MEDICINE

## 2019-05-02 RX ORDER — BENZTROPINE MESYLATE 1 MG/1
1 TABLET ORAL
COMMUNITY
Start: 2018-10-19

## 2019-05-02 NOTE — PROGRESS NOTES
SUBJECTIVE                                                      HPI: Jeanne Santiago is a pleasant 58 year old female who presents for a physical.    Accompanied by group home staff who contributes to history.     Patient will be transferring group homes due to increased needs. Patient has been having memory changes. Dementia suspected. Work-up ongoing.     No specific complaints, concerns, or questions.    PMH, PSH, FH, SH, medications, allergies, immunizations, and preventative health measures reviewed.     Past Medical History:   Diagnosis Date     Anxiety      Bipolar 1 disorder (H)      Memory changes 2019    dementia suspected; work-up ongoing     Schizophrenia (H)      Past Surgical History:   Procedure Laterality Date     COLONOSCOPY  2011    Procedure:COLONOSCOPY; colonoscopy; Surgeon:DONNA SANCHES; Location: GI     COLONOSCOPY N/A 2017    Procedure: COLONOSCOPY;  colonoscopy;  Surgeon: Dhruv Flores MD;  Location:  GI     GYN SURGERY      uterine cyst removal x3     SEPTOPLASTY      for broken nose     TUBAL LIGATION  1984     Family History   Problem Relation Age of Onset     Depression Mother      Chronic Obstructive Pulmonary Disease Mother         smoker     Cerebrovascular Disease Father         later in life     Prostate Cancer Father      Cervical Cancer Paternal Aunt      Myocardial Infarction Paternal Grandfather      Diabetes Type 2  Paternal Grandmother      Aneurysm Paternal Grandmother      Breast Cancer No family hx of      Ovarian Cancer No family hx of      Colon Cancer No family hx of      Social History     Occupational History     Occupation: Not working currently   Tobacco Use     Smoking status: Former Smoker     Packs/day: 1.00     Years: 8.00     Pack years: 8.00     Types: Cigarettes     Last attempt to quit: 1991     Years since quittin.2     Smokeless tobacco: Never Used   Substance and Sexual Activity     Alcohol use: No      Alcohol/week: 0.0 oz     Drug use: No     Sexual activity: Never   Social History Narrative    . Currently single.    3 adult kids.    2 step grand kids.     No formal exercise.     Allergies   Allergen Reactions     Levaquin Itching     Current Outpatient Medications   Medication Sig     benztropine (COGENTIN) 1 MG tablet Take 1 mg by mouth     BusPIRone HCl (BUSPAR PO) Take 5 mg by mouth 3 times daily     Cholecalciferol (VITAMIN D3) 2000 units CAPS TAKE 1 CAPSULE BY MOUTH EVERY MORNING     HALOPERIDOL PO Take 1 mg by mouth 2 times daily     omeprazole (PRILOSEC) 40 MG DR capsule TAKE 1 CAPSULE BY MOUTH EVERY MORNING BEFORE MORNING MEAL     traMADol (ULTRAM) 50 MG tablet Take 1 tablet (50 mg) by mouth every 6 hours as needed for moderate pain     traZODone (DESYREL) 150 MG tablet Take 150 mg by mouth At Bedtime     Immunization History   Administered Date(s) Administered     Influenza (IIV3) PF 09/22/2010     Influenza Quad, Recombinant, p-free (RIV4) 11/29/2018     TD (ADULT, 7+) 12/24/1999     Tdap (Adacel,Boostrix) 11/01/2009     OBJECTIVE                                                      /70   Pulse 90   Resp 18   Wt 70.9 kg (156 lb 6.4 oz)   LMP 05/27/2016 (Approximate)   SpO2 99%   BMI 26.85 kg/m    Constitutional: well-appearing  Eyes: normal conjunctivae and lids; pupils equal, round, and reactive to light  Ears, Nose, Mouth, and Throat: normal ears and nose; tympanic membranes visualized and normal; normal lips, teeth, and gums; no oropharyngeal lesions or ulcers  Neck: supple and symmetric; no lymphadenopathy; no thyromegaly or masses  Respiratory: normal respiratory effort; clear to auscultation bilaterally  Cardiovascular: regular rate and rhythm; pedal pulses palpable; no edema  Gastrointestinal: soft, non-tender, non-distended, and bowel sounds present; no organomegaly or masses  Musculoskeletal: normal gait and station  Psych: diminished judgment and insight; normal mood and  flattened affect; diminished recent memory intact    PREVENTATIVE HEALTH                                                      BMI: overweight  Blood pressure: within normal limits   Breast CA screening: up to date   Cervical CA screening: last pap performed 2016; report reviewed and was normal; repeat due in 2021  Colon CA screening: last performed 2017; report reviewed; repeat due in 2027  Lung CA screening: n/a   Dexa: not medically indicated at this time   Screening HCV: completed  Screening HIV: completed  Screening cholesterol: up to date   Screening diabetes: up to date   STD testing: no risk factors present  Alcohol misuse screening: alcohol use reviewed - no intervention indicated at this time  Immunizations: reviewed; Shingrix series DUE - group home staff to look into coverage    ASSESSMENT/PLAN                                                       (Z00.00) Routine history and physical examination of adult  (primary encounter diagnosis)  Comment: PMH, PSH, FH, SH, medications, allergies, immunizations, and preventative health measures reviewed.     (R41.3) Memory changes  Comment: patient has been having memory changes. Dementia suspected. Work-up ongoing.   Plan: agree with transfer to group home where patient's increased needs can be met.    The instructions on the AVS were discussed and explained to the patient. Patient expressed understanding of instructions.    (Chart documentation was completed, in part, with [employer] voice-recognition software. Even though reviewed, some grammatical, spelling, and word errors may remain.)    Noemy Casas MD   [address] [address]  T: [phone], F: [phone]

## 2021-01-21 ENCOUNTER — TELEPHONE (OUTPATIENT)
Dept: BEHAVIORAL HEALTH | Facility: CLINIC | Age: 61
End: 2021-01-21

## 2021-01-21 ENCOUNTER — COMMUNICATION - HEALTHEAST (OUTPATIENT)
Dept: CARE COORDINATION | Facility: HOSPITAL | Age: 61
End: 2021-01-21

## 2021-01-21 NOTE — TELEPHONE ENCOUNTER
"S: Roula ([phone]) gave clinical saying pt was bib ems last night to [facility] ER due to psychosis and lorena.   B: pt was hospitalized last at [facility] in 2018. Pt is psychotic. She has been in their er 4x since 1/15 and has been getting worse. She had been calling 911 saying that her roommate was dying and having a heart attack. She was trying to convince her roommmate she was dying. No cd hx. Utox pos for tricyclics. Paranoid with delusions. She said she thinks the nurses are trying to kill her at her assisted living facility and thinks her antibiotics are killing her even though her cellulitis in her legs is improving. She called 911 saying her meds are killing her and that she needed her stomach pumped immediately. She says she thinks the FBI is hunting her. She has tried to elope from the facility. She recently left and walked 4 miles, causing significant damage to her feet. Hx of schizoaffective d/o bipolar type. Her daughter is her legal guardian per Roula and would sign her in. No hx of aggression. Hx of civil commitment in 2015 and 2018.No chronic med prob's. No Covid symptoms. Covid test neg. No dementia dx per Roula. Per faxed info, pt has a \"mild cognitive impairment.\" In another area of faxed clinical, it says pt has dementia, borderline personality d/o, a sleep d/o, encephalopathy, convulsions and incontinence. Bicar 34, blood urea nitrogen 19, MCHC 31, Leukocytes trace.  A: denies mh issues, coop, irritable, walking indep, denies SI, denies ibrahim's, appears paranoid with no insight, med cleared, emergency admit hold  R: author called Roula back stating that clinical info faxed to intake states pt has dementia. Author explained this is an exclusionary dx. She said she thinks this may be an error and said she will check and call back. eli Celeste called back and she said she spoke with the director of nursing at [facility] and said they do not have her listed as having dementia. She " said RN will send an updated problem list. She also said pt does not have incontinence. eli  Author called Lorrie at 10:45 am. She said pt could possibly be in a delirium and that she is concerned that if pt decompensated with her cellulitis, that they don't have medical beds there. She declined to accept pt and suggested possibly pursuing [facility] for an admit. eli  Paged Jennifer at 10:54 am. eli Rodriguez requested author fax clinical to RN on [facility] to review and also requested author email clinical to her. Author called [facility] and informed them of fax coming and requested they review the case. Author then faxed clinical to [facility] and emailed clinical to Jennifer. Await outcome. eli

## 2021-01-21 NOTE — TELEPHONE ENCOUNTER
R: Candyflorentin called back and stated she will have to decline pt due to patient has Delirium and behavioral issues. She stated pt would not be a good fit on [facility]. Pt was declined for [facility]     R: Called [facility] and spoke with Roula and informed her Pt was declined.

## 2021-01-22 ENCOUNTER — TELEPHONE (OUTPATIENT)
Dept: BEHAVIORAL HEALTH | Facility: CLINIC | Age: 61
End: 2021-01-22

## 2021-01-22 NOTE — TELEPHONE ENCOUNTER
R: Roula ([phone] or er at [phone] to Dr Saleh) called again asking for their ED provider to speak with the provider who declined pt yesterday as their ED provider reportedly states he does not feel pt has delirium. She said pt slept 5 hours last night compared to 3 hours the previous night. eli  Paged Jennifer at 12:55 pm. eli  Per Jennifer, 3b's milieu is too acute and is not expected to change this weekend or in the next week. Author informed Roula. eli

## 2021-07-20 VITALS — BODY MASS INDEX: 27.34 KG/M2 | WEIGHT: 164.31 LBS

## 2021-07-20 NOTE — PROCEDURES
$ Electroconvulsive therapy  Date/Time: 10/24/2018 8:11 AM  Performed by: NIA MAGANA  Authorized by: NIA MAGANA             ECT Procedure    Patient Name:  Jeanne Santiago       Medical Record Number: [id number]   YOB: 1960  Date of Procedure: 10/24/2018            Primary / referring psychiatrist   JONATHAN Schumacher CNP    Diagnosis:       Past Medical History:   Diagnosis Date     Depression      Sinusitis        Patient Active Problem List   Diagnosis     Psychosis (H)     Schizoaffective disorder, bipolar type (H)       History & Physical has been reviewed and the patient's status is unchanged.      Time out was taken with staff to confirm correct patient and correct procedure to be performed.    Session Number: 4/6    ECT Type: Outpatient    Electrode Placement: bilateral    % Energy Set: 80%    Seizure Duration (EEG): 49 sec central,  peripheral Sz  not observed (previous central seizure  48  sec)    Results: Clinical seizure was satisfactory    Complications: None    Plan:   1. discharge when clear  2. next ect   10/26/18    Additional Notes           (See scanned strip obtained during procedure)

## 2021-07-20 NOTE — ANESTHESIA PREPROCEDURE EVALUATION
Anesthesia Evaluation      Patient summary reviewed   No history of anesthetic complications     Airway   Mallampati: III  Neck ROM: full  Comment: Limited mouth opening   Pulmonary - normal exam    breath sounds clear to auscultation  (+) sleep apnea (Heavy snoring, no diagnosis of JOSELO but patient thinks that she may have JOSELO) on no CPAP, ,                          Cardiovascular - negative ROS and normal exam  Exercise tolerance: > or = 4 METS  (-) murmur  Rhythm: regular  Rate: normal,    no murmur      Neuro/Psych      Comments: Schizoaffective Disorder Bipolar Type    Endo/Other - negative ROS      GI/Hepatic/Renal - negative ROS           Dental - normal exam                        Anesthesia Plan  Planned anesthetic: total IV anesthesia and general mask  Brief TIVA,  Limited mouth opening possible undiagnosed JOSELO  ASA 2   Induction: intravenous   Anesthetic plan and risks discussed with: patient    Post-op plan: routine recovery

## 2021-07-20 NOTE — ANESTHESIA POSTPROCEDURE EVALUATION
Patient: Jeanne Santiago  * No procedures listed *  Anesthesia type: general    Patient location: PACU  Last vitals:   Vitals:    10/31/18 0833   BP: 165/67   Pulse: (!) 120   Resp:    Temp:    SpO2: 99%     Post vital signs: stable  Level of consciousness: awake and responds to simple questions  Post-anesthesia pain: pain controlled  Post-anesthesia nausea and vomiting: no  Pulmonary: unassisted, return to baseline  Cardiovascular: stable and blood pressure at baseline  Hydration: adequate  Anesthetic events: no    QCDR Measures:  ASA# 11 - Tanika-op Cardiac Arrest: ASA11B - Patient did NOT experience unanticipated cardiac arrest  ASA# 12 - Tanika-op Mortality Rate: ASA12B - Patient did NOT die  ASA# 13 - PACU Re-Intubation Rate: ASA13B - Patient did NOT require a new airway mgmt  ASA# 10 - Composite Anes Safety: ASA10A - No serious adverse event    Additional Notes:

## 2021-07-20 NOTE — ANESTHESIA CARE TRANSFER NOTE
Last vitals:   Vitals:    10/29/18 0818   BP: 154/72   Pulse: (!) 102   Resp: 14   Temp: 37.2  C (98.9  F)   SpO2: 100%     Patient's level of consciousness is drowsy  Spontaneous respirations: yes  Maintains airway independently: yes  Dentition unchanged: yes  Oropharynx: oropharynx clear of all foreign objects    QCDR Measures:  ASA# 20 - Surgical Safety Checklist: WHO surgical safety checklist completed prior to induction  PQRS# 430 - Adult PONV Prevention: 4558F - Pt received => 2 anti-emetic agents (different classes) preop & intraop  ASA# 8 - Peds PONV Prevention: NA - Not pediatric patient, not GA or 2 or more risk factors NOT present  PQRS# 424 - Tanika-op Temp Management: 4559F - At least one body temp DOCUMENTED => 35.5C or 95.9F within required timeframe  PQRS# 426 - PACU Transfer Protocol: - Transfer of care checklist used  ASA# 14 - Acute Post-op Pain: ASA14B - Patient did NOT experience pain >= 7 out of 10

## 2021-07-20 NOTE — ANESTHESIA CARE TRANSFER NOTE
Last vitals:   Vitals:    10/22/18 0824   BP: 145/80   Pulse: 100   Resp: 24   Temp:    SpO2: 94%     Patient's level of consciousness is awake  Spontaneous respirations: yes  Maintains airway independently: yes  Dentition unchanged: yes  Oropharynx: oropharynx clear of all foreign objects    QCDR Measures:  ASA# 20 - Surgical Safety Checklist: WHO surgical safety checklist completed prior to induction  PQRS# 430 - Adult PONV Prevention: NA - Not adult patient, not GA or 3 or more risk factors NOT present  ASA# 8 - Peds PONV Prevention: NA - Not pediatric patient, not GA or 2 or more risk factors NOT present  PQRS# 424 - Tanika-op Temp Management: NA - MAC anesthesia or case < 60 minutes  PQRS# 426 - PACU Transfer Protocol: - Transfer of care checklist used  ASA# 14 - Acute Post-op Pain: ASA14B - Patient did NOT experience pain >= 7 out of 10

## 2021-07-20 NOTE — ANESTHESIA PREPROCEDURE EVALUATION
Anesthesia Evaluation      Patient summary reviewed   No history of anesthetic complications     Airway   Mallampati: III  Neck ROM: full  Comment: Limited mouth opening   Pulmonary - normal exam    breath sounds clear to auscultation  (+) sleep apnea (Heavy snoring, no diagnosis of JOSELO but patient thinks that she may have JOSELO),                          Cardiovascular - negative ROS and normal exam  Exercise tolerance: > or = 4 METS  (-) murmur  Rhythm: regular  Rate: normal,    no murmur      Neuro/Psych      Comments: Schizoaffective Disorder Bipolar Type    Endo/Other - negative ROS      GI/Hepatic/Renal - negative ROS           Dental - normal exam                          Anesthesia Plan  Planned anesthetic: total IV anesthesia and general mask  Brief TIVA,  Limited mouth opening possible undiagnosed JOSELO  ASA 2   Induction: intravenous   Anesthetic plan and risks discussed with: patient    Post-op plan: routine recovery

## 2021-07-20 NOTE — ANESTHESIA CARE TRANSFER NOTE
Last vitals:   Vitals:    10/24/18 0818   BP: 178/82   Pulse: (!) 105   Resp:    Temp:    SpO2: 99%     Patient's level of consciousness is drowsy  Spontaneous respirations: yes  Maintains airway independently: yes  Dentition unchanged: yes  Oropharynx: oropharynx clear of all foreign objects    QCDR Measures:  ASA# 20 - Surgical Safety Checklist: WHO surgical safety checklist completed prior to induction  PQRS# 430 - Adult PONV Prevention: NA - Not adult patient, not GA or 3 or more risk factors NOT present  ASA# 8 - Peds PONV Prevention: NA - Not pediatric patient, not GA or 2 or more risk factors NOT present  PQRS# 424 - Tanika-op Temp Management: NA - MAC anesthesia or case < 60 minutes  PQRS# 426 - PACU Transfer Protocol: - Transfer of care checklist used  ASA# 14 - Acute Post-op Pain: ASA14B - Patient did NOT experience pain >= 7 out of 10

## 2021-07-20 NOTE — ANESTHESIA CARE TRANSFER NOTE
Last vitals:   Vitals:    10/17/18 0943   BP: (!) 159/99   Pulse: (!) 114   Resp: 16   Temp: 37.3  C (99.1  F)   SpO2: 95%     Patient's level of consciousness is drowsy  Spontaneous respirations: yes  Maintains airway independently: yes  Dentition unchanged: yes  Oropharynx: oropharynx clear of all foreign objects    QCDR Measures:  ASA# 20 - Surgical Safety Checklist: WHO surgical safety checklist completed prior to induction  PQRS# 430 - Adult PONV Prevention: NA - Not adult patient, not GA or 3 or more risk factors NOT present  ASA# 8 - Peds PONV Prevention: NA - Not pediatric patient, not GA or 2 or more risk factors NOT present  PQRS# 424 - Tanika-op Temp Management: NA - MAC anesthesia or case < 60 minutes  PQRS# 426 - PACU Transfer Protocol: - Transfer of care checklist used  ASA# 14 - Acute Post-op Pain: ASA14B - Patient did NOT experience pain >= 7 out of 10

## 2021-07-20 NOTE — ANESTHESIA POSTPROCEDURE EVALUATION
Patient: Jeanne Santiago  * No procedures listed *  Anesthesia type: general    Patient location: PACU  Last vitals:   Vitals:    10/22/18 0835   BP: 125/76   Pulse: (!) 109   Resp: 18   Temp:    SpO2: 97%     Post vital signs: stable  Level of consciousness: awake, alert and oriented  Post-anesthesia pain: pain controlled  Post-anesthesia nausea and vomiting: no  Pulmonary: unassisted, return to baseline  Cardiovascular: stable and tachycardic and BP at baseline  Hydration: adequate  Anesthetic events: no    QCDR Measures:  ASA# 11 - Tanika-op Cardiac Arrest: ASA11B - Patient did NOT experience unanticipated cardiac arrest  ASA# 12 - Tanika-op Mortality Rate: ASA12B - Patient did NOT die  ASA# 13 - PACU Re-Intubation Rate: NA - No ETT / LMA used for case  ASA# 10 - Composite Anes Safety: ASA10A - No serious adverse event    Additional Notes:

## 2021-07-20 NOTE — ANESTHESIA POSTPROCEDURE EVALUATION
Patient: Jeanne Santiago  * No procedures listed *  Anesthesia type: general    Patient location: PACU  Last vitals:   Vitals:    10/19/18 0945   BP: 134/82   Pulse: 98   Resp:    Temp:    SpO2: 93%     Post vital signs: stable  Level of consciousness: awake, alert and oriented  Post-anesthesia pain: pain controlled  Post-anesthesia nausea and vomiting: no  Pulmonary: unassisted, return to baseline  Cardiovascular: stable and blood pressure at baseline  Hydration: adequate  Anesthetic events: no    QCDR Measures:  ASA# 11 - Tanika-op Cardiac Arrest: ASA11B - Patient did NOT experience unanticipated cardiac arrest  ASA# 12 - Tanika-op Mortality Rate: ASA12B - Patient did NOT die  ASA# 13 - PACU Re-Intubation Rate: NA - No ETT / LMA used for case  ASA# 10 - Composite Anes Safety: ASA10A - No serious adverse event    Additional Notes:

## 2021-07-20 NOTE — ANESTHESIA CARE TRANSFER NOTE
Last vitals:   Vitals:    10/19/18 0911   BP: (!) 160/101   Pulse: (!) 105   Resp: 16   Temp:    SpO2: 97%     Patient's level of consciousness is drowsy  Spontaneous respirations: yes  Maintains airway independently: yes  Dentition unchanged: yes  Oropharynx: oropharynx clear of all foreign objects    QCDR Measures:  ASA# 20 - Surgical Safety Checklist: WHO surgical safety checklist completed prior to induction  PQRS# 430 - Adult PONV Prevention: 4558F-8P - Pt did NOT receive => 2 anti-emetic agents  ASA# 8 - Peds PONV Prevention: NA - Not pediatric patient, not GA or 2 or more risk factors NOT present  PQRS# 424 - Tanika-op Temp Management: 4559F - At least one body temp DOCUMENTED => 35.5C or 95.9F within required timeframe  PQRS# 426 - PACU Transfer Protocol: - Transfer of care checklist used  ASA# 14 - Acute Post-op Pain: ASA14B - Patient did NOT experience pain >= 7 out of 10

## 2021-07-20 NOTE — ANESTHESIA POSTPROCEDURE EVALUATION
Patient: Jeanne Santiago  * No procedures listed *  Anesthesia type: general    Patient location: PACU  Last vitals:   Vitals:    10/29/18 0858   BP: 139/70   Pulse: 89   Resp: 20   Temp:    SpO2: 98%     Post vital signs: stable  Level of consciousness: awake and responds to simple questions  Post-anesthesia pain: pain controlled  Post-anesthesia nausea and vomiting: no  Pulmonary: unassisted, return to baseline  Cardiovascular: stable and blood pressure at baseline  Hydration: adequate  Anesthetic events: no    QCDR Measures:  ASA# 11 - Tanika-op Cardiac Arrest: ASA11B - Patient did NOT experience unanticipated cardiac arrest  ASA# 12 - Tanika-op Mortality Rate: ASA12B - Patient did NOT die  ASA# 13 - PACU Re-Intubation Rate: NA - No ETT / LMA used for case  ASA# 10 - Composite Anes Safety: ASA10A - No serious adverse event    Additional Notes:

## 2021-07-20 NOTE — ANESTHESIA POSTPROCEDURE EVALUATION
Patient: Jeanne Santiago  * No procedures listed *  Anesthesia type: No value filed.    Patient location: PACU  Last vitals:   Vitals:    10/24/18 0830   BP: 131/75   Pulse: (!) 104   Resp: 20   Temp:    SpO2: 100%     Post vital signs: stable  Level of consciousness: awake and responds to simple questions  Post-anesthesia pain: pain controlled  Post-anesthesia nausea and vomiting: no  Pulmonary: unassisted, return to baseline  Cardiovascular: stable and blood pressure at baseline  Hydration: adequate  Anesthetic events: no    QCDR Measures:  ASA# 11 - Tanika-op Cardiac Arrest: ASA11B - Patient did NOT experience unanticipated cardiac arrest  ASA# 12 - Tanika-op Mortality Rate: ASA12B - Patient did NOT die  ASA# 13 - PACU Re-Intubation Rate: NA - No ETT / LMA used for case  ASA# 10 - Composite Anes Safety: ASA10A - No serious adverse event    Additional Notes:

## 2021-07-20 NOTE — ANESTHESIA POSTPROCEDURE EVALUATION
Patient: Jeanne Santiago  Anesthesia type: general    Patient location: PACU  Last vitals:   Vitals:    10/17/18 1036   BP: 119/77   Pulse: 98   Resp: 18   Temp: 36.7  C (98.1  F)   SpO2:      Post vital signs: stable  Level of consciousness: awake and responds to simple questions  Post-anesthesia pain: pain controlled  Post-anesthesia nausea and vomiting: no  Pulmonary: unassisted, return to baseline  Cardiovascular: stable and blood pressure at baseline  Hydration: adequate  Anesthetic events: no    QCDR Measures:  ASA# 11 - Tanika-op Cardiac Arrest: ASA11B - Patient did NOT experience unanticipated cardiac arrest  ASA# 12 - Tanika-op Mortality Rate: ASA12B - Patient did NOT die  ASA# 13 - PACU Re-Intubation Rate: NA - No ETT / LMA used for case  ASA# 10 - Composite Anes Safety: ASA10A - No serious adverse event    Additional Notes:

## 2021-07-20 NOTE — ANESTHESIA PREPROCEDURE EVALUATION
Anesthesia Evaluation      Patient summary reviewed   No history of anesthetic complications     Airway   Mallampati: III  Neck ROM: full  Comment: Limited mouth opening   Pulmonary - normal exam    breath sounds clear to auscultation  (+) sleep apnea (Heavy snoring, no diagnosis of JOSELO but patient thinks that she may have JOSELO),                          Cardiovascular - negative ROS and normal exam  Exercise tolerance: > or = 4 METS  (-) murmur  Rhythm: regular  Rate: normal,    no murmur      Neuro/Psych      Comments: Schizoaffective Disorder Bipolar Type    Endo/Other - negative ROS      GI/Hepatic/Renal - negative ROS           Dental    (+) poor dentition                         Anesthesia Plan  Planned anesthetic: total IV anesthesia and general mask  Brief TIVA,  Limited mouth opening possible undiagnosed JOSELO  ASA 3   Induction: intravenous   Anesthetic plan and risks discussed with: patient    Post-op plan: routine recovery

## 2021-07-20 NOTE — TELEPHONE ENCOUNTER
"S: Roula ([phone]) gave clinical saying pt was bib ems last night to [facility] ER due to psychosis and lorena.   B: pt was hospitalized last at [facility] in 2018. Pt is psychotic. She has been in their er 4x since 1/15 and has been getting worse. She had been calling 911 saying that her roommate was dying and having a heart attack. She was trying to convince her roommmate she was dying. No cd hx. Utox pos for tricyclics. Paranoid with delusions. She said she thinks the nurses are trying to kill her at her assisted living facility and thinks her antibiotics are killing her even though her cellulitis in her legs is improving. She called 911 saying her meds are killing her and that she needed her stomach pumped immediately. She says she thinks the FBI is hunting her. She has tried to elope from the facility. She recently left and walked 4 miles, causing significant damage to her feet. Hx of schizoaffective d/o bipolar type. Her daughter is her legal guardian per Roula and would sign her in. No hx of aggression. Hx of civil commitment in 2015 and 2018.No chronic med prob's. No Covid symptoms. Covid test neg. No dementia dx per Roula. Per faxed info, pt has a \"mild cognitive impairment.\" In another area of faxed clinical, it says pt has dementia, borderline personality d/o, a sleep d/o, encephalopathy,convulsions and incontinence. Bicar 34, blood urea nitrogen 19, MCHC 31, Leukocytes trace.  A: denies mh issues, coop, irritable, walking indep, denies SI, denies ibrahim's, appears paranoid with no insight, med cleared, emergency admit hold  R: author called Roula back stating that clinical info faxed to intake states pt has dementia. Author explained this is an exclusionary dx. She said she thinks this may be an error and said she will check and call back. eli Celeste called back and she said she spoke with the director of nursing at [facility] and said they do not have her listed as having dementia. She " said RN will send an updated problem list. She also said pt does not have incontinence. eli  Author called Lorrie at 10:45 am. She said pt could possibly be in a delirium and that she is concerned that if pt decompensated with her cellulitis, that they don't have medical beds there. She declined to accept pt and suggested possibly pursuing [facility] for an admit. eli  Paged Jennifer at 10:54 am. eli

## 2021-07-20 NOTE — ANESTHESIA CARE TRANSFER NOTE
Last vitals:   Vitals:    10/31/18 0833   BP: 165/67   Pulse: (!) 120   Resp:    Temp:    SpO2: 99%     Patient's level of consciousness is drowsy  Spontaneous respirations: yes  Maintains airway independently: yes  Dentition unchanged: yes  Oropharynx: oropharynx clear of all foreign objects    QCDR Measures:  ASA# 20 - Surgical Safety Checklist: WHO surgical safety checklist completed prior to induction  PQRS# 430 - Adult PONV Prevention: NA - Not adult patient, not GA or 3 or more risk factors NOT present  ASA# 8 - Peds PONV Prevention: NA - Not pediatric patient, not GA or 2 or more risk factors NOT present  PQRS# 424 - Tanika-op Temp Management: NA - MAC anesthesia or case < 60 minutes  PQRS# 426 - PACU Transfer Protocol: - Transfer of care checklist used  ASA# 14 - Acute Post-op Pain: ASA14B - Patient did NOT experience pain >= 7 out of 10

## 2021-11-08 NOTE — MR AVS SNAPSHOT
After Visit Summary   3/9/2018    Jeanne Santiago    MRN: [id number]           Patient Information     Date Of Birth          1960        Visit Information        Provider Department      3/9/2018 2:30 PM  [facility]        Care Instructions    - Goal of at least 150 minutes/week of physical activity - walk, working at the horse barn, chair exercise or videos on YouTube   - Work towards more variety in your diet -- more lean protein like chicken, turkey, fish, egg whites & more vegetables   - Continue with three meals/day           Follow-ups after your visit        Your next 10 appointments already scheduled     May 18, 2018  2:30 PM CDT   Office Visit with  [facility] ([facility])    [address] [address] [phone]           Bring a current list of meds and any records pertaining to this visit. For Physicals, please bring immunization records and any forms needing to be filled out. Please arrive 10 minutes early to complete paperwork.            May 23, 2018 10:00 AM CDT   PHYSICAL with Noemy Casas MD   [facility] ([facility]    [address] [place] [id number] [phone]              Who to contact     If you have questions or need follow up information about today's clinic visit or your schedule please contact [facility] directly at [phone].  Normal or non-critical lab and imaging results will be communicated to you by MyChart, letter or phone within 4 business days after the clinic has received the results. If you do not hear from us within 7 days, please contact the clinic through MyChart or phone. If you have a critical or abnormal lab result, we will notify you by phone as soon as possible.  Submit refill requests through [employer] or call  Will also forward to dr. Alaniz    "your pharmacy and they will forward the refill request to us. Please allow 3 business days for your refill to be completed.          Additional Information About Your Visit        MyChart Information     [employer] lets you send messages to your doctor, view your test results, renew your prescriptions, schedule appointments and more. To sign up, go to [email or web address] . Click on \"Log in\" on the left side of the screen, which will take you to the Welcome page. Then click on \"Sign up Now\" on the right side of the page.     You will be asked to enter the access code listed below, as well as some personal information. Please follow the directions to create your username and password.     Your access code is: [email or web address]  Expires: 2018  1:19 PM     Your access code will  in 90 days. If you need help or a new code, please call your [facility] clinic or [phone].        Care EveryWhere ID     This is your Care EveryWhere ID. This could be used by other organizations to access your [facility] medical records  [id number]        Your Vitals Were     Last Period                   2016 (Approximate)            Blood Pressure from Last 3 Encounters:   18 110/60   17 120/60   17 119/83    Weight from Last 3 Encounters:   18 71.5 kg (157 lb 9.6 oz)   17 70.5 kg (155 lb 6.4 oz)   17 69.4 kg (153 lb)              Today, you had the following     No orders found for display       Primary Care Provider Fax #    Physician No Ref-Primary [phone]       No address on file        Equal Access to Services     [facility] : Hadii kenia langston hadvalentino Sonatalia, waaxda luqadaha, qaybta kaalmada jhonny, kev laguna. So [facility] [phone].    ATENCIÓN: Si habla español, tiene a horowitz disposición servicios gratuitos de asistencia lingüística. Llame al [phone].    We comply with applicable federal civil rights laws and [place] laws. We do not " discriminate on the basis of race, color, national origin, age, disability, sex, sexual orientation, or gender identity.            Thank you!     Thank you for choosing [facility]  for your care. Our goal is always to provide you with excellent care. Hearing back from our patients is one way we can continue to improve our services. Please take a few minutes to complete the written survey that you may receive in the mail after your visit with us. Thank you!             Your Updated Medication List - Protect others around you: Learn how to safely use, store and throw away your medicines at www.disposemymeds.org.          This list is accurate as of 3/9/18  3:08 PM.  Always use your most recent med list.                   Brand Name Dispense Instructions for use Diagnosis    ARTIFICIAL TEARS 1.4 % ophthalmic solution   Generic drug:  polyvinyl alcohol      Place 1 drop into both eyes every 4 hours as needed for dry eyes        Calcium carb-Vitamin D 500 mg [place]-200 units 500-200 MG-UNIT per tablet    OSCAL with D;Oyster Shell Calcium    180 tablet    Take 1 tablet by mouth 2 times daily    Encounter for medication refill       CERTAVITE/ANTIOXIDANTS PO      Take 1 tablet by mouth daily        gabapentin 100 MG capsule    NEURONTIN     Take 100 mg by mouth 2 times daily        omeprazole 40 MG capsule    priLOSEC    30 capsule    TAKE 1 CAPSULE BY MOUTH EVERY MORNING BEFORE A MEAL    Gastroesophageal reflux disease, esophagitis presence not specified       ranitidine 300 MG tablet    ZANTAC    90 tablet    Take 1 tablet (300 mg) by mouth At Bedtime    Gastroesophageal reflux disease, esophagitis presence not specified       REXULTI 3 MG tablet   Generic drug:  brexpiprazole      Take 3 mg by mouth daily        sodium chloride 0.65 % nasal spray    OCEAN     Spray 1 spray into both nostrils every 2 hours as needed for congestion        traMADol 50 MG tablet    ULTRAM    20 tablet    Take 1  tablet (50 mg) by mouth every 6 hours as needed for moderate pain        traZODone 150 MG tablet    DESYREL     Take 150 mg by mouth At Bedtime        * venlafaxine 150 MG Tb24 24 hr tablet    EFFEXOR-ER     Take 150 mg by mouth daily (with breakfast)        * venlafaxine 75 MG 24 hr capsule    EFFEXOR-XR     Take 75 mg by mouth daily        vitamin D 2000 UNITS Caps     90 capsule    Take 2,000 Units by mouth daily    Vitamin D deficiency       * Notice:  This list has 2 medication(s) that are the same as other medications prescribed for you. Read the directions carefully, and ask your doctor or other care provider to review them with you.

## (undated) RX ORDER — FENTANYL CITRATE 50 UG/ML
INJECTION, SOLUTION INTRAMUSCULAR; INTRAVENOUS
Status: DISPENSED
Start: 2017-06-20